# Patient Record
Sex: FEMALE | NOT HISPANIC OR LATINO
[De-identification: names, ages, dates, MRNs, and addresses within clinical notes are randomized per-mention and may not be internally consistent; named-entity substitution may affect disease eponyms.]

---

## 2018-02-19 ENCOUNTER — RESULT REVIEW (OUTPATIENT)
Age: 58
End: 2018-02-19

## 2018-06-30 ENCOUNTER — INPATIENT (INPATIENT)
Facility: HOSPITAL | Age: 58
LOS: 10 days | Discharge: HOME | DRG: 690 | End: 2018-07-11
Attending: STUDENT IN AN ORGANIZED HEALTH CARE EDUCATION/TRAINING PROGRAM | Admitting: STUDENT IN AN ORGANIZED HEALTH CARE EDUCATION/TRAINING PROGRAM
Payer: MEDICAID

## 2018-06-30 VITALS
OXYGEN SATURATION: 99 % | HEART RATE: 99 BPM | DIASTOLIC BLOOD PRESSURE: 78 MMHG | WEIGHT: 151.9 LBS | RESPIRATION RATE: 16 BRPM | TEMPERATURE: 98 F | SYSTOLIC BLOOD PRESSURE: 112 MMHG

## 2018-06-30 DIAGNOSIS — N30.91 CYSTITIS, UNSPECIFIED WITH HEMATURIA: ICD-10-CM

## 2018-06-30 DIAGNOSIS — Z90.710 ACQUIRED ABSENCE OF BOTH CERVIX AND UTERUS: Chronic | ICD-10-CM

## 2018-06-30 LAB
ALBUMIN SERPL ELPH-MCNC: 4.4 G/DL — SIGNIFICANT CHANGE UP (ref 3.3–5)
ALP SERPL-CCNC: 101 U/L — SIGNIFICANT CHANGE UP (ref 40–120)
ALT FLD-CCNC: 37 U/L — SIGNIFICANT CHANGE UP (ref 10–45)
ANION GAP SERPL CALC-SCNC: 18 MMOL/L — HIGH (ref 5–17)
APPEARANCE UR: ABNORMAL
APTT BLD: 26.1 SEC — LOW (ref 27.5–37.4)
AST SERPL-CCNC: 34 U/L — SIGNIFICANT CHANGE UP (ref 10–40)
BACTERIA # UR AUTO: ABNORMAL /HPF
BASOPHILS # BLD AUTO: 0 K/UL — SIGNIFICANT CHANGE UP (ref 0–0.2)
BASOPHILS NFR BLD AUTO: 0 % — SIGNIFICANT CHANGE UP (ref 0–2)
BILIRUB SERPL-MCNC: 0.7 MG/DL — SIGNIFICANT CHANGE UP (ref 0.2–1.2)
BILIRUB UR-MCNC: NEGATIVE — SIGNIFICANT CHANGE UP
BUN SERPL-MCNC: 18 MG/DL — SIGNIFICANT CHANGE UP (ref 7–23)
CALCIUM SERPL-MCNC: 9.3 MG/DL — SIGNIFICANT CHANGE UP (ref 8.4–10.5)
CHLORIDE SERPL-SCNC: 101 MMOL/L — SIGNIFICANT CHANGE UP (ref 96–108)
CO2 SERPL-SCNC: 24 MMOL/L — SIGNIFICANT CHANGE UP (ref 22–31)
COLOR SPEC: ABNORMAL
CREAT SERPL-MCNC: 1.13 MG/DL — SIGNIFICANT CHANGE UP (ref 0.5–1.3)
DIFF PNL FLD: ABNORMAL
EOSINOPHIL # BLD AUTO: 0 K/UL — SIGNIFICANT CHANGE UP (ref 0–0.5)
EOSINOPHIL NFR BLD AUTO: 0 % — SIGNIFICANT CHANGE UP (ref 0–6)
GLUCOSE SERPL-MCNC: 115 MG/DL — HIGH (ref 70–99)
GLUCOSE UR QL: ABNORMAL
HCT VFR BLD CALC: 34.5 % — SIGNIFICANT CHANGE UP (ref 34.5–45)
HGB BLD-MCNC: 11.8 G/DL — SIGNIFICANT CHANGE UP (ref 11.5–15.5)
INR BLD: 1.16 RATIO — SIGNIFICANT CHANGE UP (ref 0.88–1.16)
KETONES UR-MCNC: ABNORMAL
LEUKOCYTE ESTERASE UR-ACNC: ABNORMAL
LYMPHOCYTES # BLD AUTO: 10 % — LOW (ref 13–44)
LYMPHOCYTES # BLD AUTO: 2.4 K/UL — SIGNIFICANT CHANGE UP (ref 1–3.3)
MCHC RBC-ENTMCNC: 34.3 GM/DL — SIGNIFICANT CHANGE UP (ref 32–36)
MCHC RBC-ENTMCNC: 34.8 PG — HIGH (ref 27–34)
MCV RBC AUTO: 101 FL — HIGH (ref 80–100)
MONOCYTES # BLD AUTO: 0.3 K/UL — SIGNIFICANT CHANGE UP (ref 0–0.9)
MONOCYTES NFR BLD AUTO: 1 % — LOW (ref 2–14)
NEUTROPHILS # BLD AUTO: 33.1 K/UL — HIGH (ref 1.8–7.4)
NEUTROPHILS NFR BLD AUTO: 79 % — HIGH (ref 43–77)
NITRITE UR-MCNC: NEGATIVE — SIGNIFICANT CHANGE UP
PH UR: 6.5 — SIGNIFICANT CHANGE UP (ref 5–8)
PLATELET # BLD AUTO: 153 K/UL — SIGNIFICANT CHANGE UP (ref 150–400)
POTASSIUM SERPL-MCNC: 3.6 MMOL/L — SIGNIFICANT CHANGE UP (ref 3.5–5.3)
POTASSIUM SERPL-SCNC: 3.6 MMOL/L — SIGNIFICANT CHANGE UP (ref 3.5–5.3)
PROT SERPL-MCNC: 7 G/DL — SIGNIFICANT CHANGE UP (ref 6–8.3)
PROT UR-MCNC: 300 MG/DL
PROTHROM AB SERPL-ACNC: 12.7 SEC — SIGNIFICANT CHANGE UP (ref 9.8–12.7)
RBC # BLD: 3.4 M/UL — LOW (ref 3.8–5.2)
RBC # FLD: 14.2 % — SIGNIFICANT CHANGE UP (ref 10.3–14.5)
RBC CASTS # UR COMP ASSIST: >50 /HPF (ref 0–2)
SODIUM SERPL-SCNC: 143 MMOL/L — SIGNIFICANT CHANGE UP (ref 135–145)
SP GR SPEC: 1.02 — SIGNIFICANT CHANGE UP (ref 1.01–1.02)
UROBILINOGEN FLD QL: NEGATIVE — SIGNIFICANT CHANGE UP
WBC # BLD: 35.9 K/UL — HIGH (ref 3.8–10.5)
WBC # FLD AUTO: 35.9 K/UL — HIGH (ref 3.8–10.5)
WBC UR QL: SIGNIFICANT CHANGE UP /HPF (ref 0–5)

## 2018-06-30 PROCEDURE — 99223 1ST HOSP IP/OBS HIGH 75: CPT | Mod: AI

## 2018-06-30 PROCEDURE — 99284 EMERGENCY DEPT VISIT MOD MDM: CPT

## 2018-06-30 RX ORDER — SODIUM CHLORIDE 9 MG/ML
1000 INJECTION INTRAMUSCULAR; INTRAVENOUS; SUBCUTANEOUS ONCE
Qty: 0 | Refills: 0 | Status: COMPLETED | OUTPATIENT
Start: 2018-06-30 | End: 2018-06-30

## 2018-06-30 RX ORDER — CEPHALEXIN 500 MG
1 CAPSULE ORAL
Qty: 14 | Refills: 0 | OUTPATIENT
Start: 2018-06-30 | End: 2018-07-06

## 2018-06-30 RX ORDER — CEPHALEXIN 500 MG
500 CAPSULE ORAL ONCE
Qty: 0 | Refills: 0 | Status: COMPLETED | OUTPATIENT
Start: 2018-06-30 | End: 2018-06-30

## 2018-06-30 RX ORDER — CEFTRIAXONE 500 MG/1
1 INJECTION, POWDER, FOR SOLUTION INTRAMUSCULAR; INTRAVENOUS ONCE
Qty: 0 | Refills: 0 | Status: COMPLETED | OUTPATIENT
Start: 2018-06-30 | End: 2018-06-30

## 2018-06-30 RX ORDER — CEFTRIAXONE 500 MG/1
1 INJECTION, POWDER, FOR SOLUTION INTRAMUSCULAR; INTRAVENOUS ONCE
Qty: 0 | Refills: 0 | Status: DISCONTINUED | OUTPATIENT
Start: 2018-06-30 | End: 2018-06-30

## 2018-06-30 RX ADMIN — Medication 500 MILLIGRAM(S): at 21:45

## 2018-06-30 RX ADMIN — SODIUM CHLORIDE 1000 MILLILITER(S): 9 INJECTION INTRAMUSCULAR; INTRAVENOUS; SUBCUTANEOUS at 21:45

## 2018-06-30 RX ADMIN — SODIUM CHLORIDE 1000 MILLILITER(S): 9 INJECTION INTRAMUSCULAR; INTRAVENOUS; SUBCUTANEOUS at 23:01

## 2018-06-30 RX ADMIN — CEFTRIAXONE 100 GRAM(S): 500 INJECTION, POWDER, FOR SOLUTION INTRAMUSCULAR; INTRAVENOUS at 23:01

## 2018-06-30 NOTE — H&P ADULT - NSHPSOCIALHISTORY_GEN_ALL_CORE
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

## 2018-06-30 NOTE — ED ADULT NURSE NOTE - OBJECTIVE STATEMENT
57 y.o F presents to the ED from home c/o hematuria. Patient is awake, alert and speaking coherently. Patient states she is currently being treated for stage 2 breast cancer with chemo- states her last chemo was on Wednesday 6/27. Patient states that since last night she has been noticing bright red blood in her urine accompanied by urinary straining. Additionally, patient states she's had the chills, has been feeling weak and has been having nose bleeds. Patient presents A&Ox3, afebrile and ambulatory; denies fever at home, denies chest pain and SOB, lungs clear; abdomen soft, nontender and nondistended, + some nausea but denies vomiting and diarrhea, denies blood in stool. 57 y.o F presents to the ED from home c/o hematuria. Patient is awake, alert and speaking coherently. Patient states she is currently being treated for stage 2 breast cancer with chemo- states her last chemo was on Wednesday 6/27. Patient states that since last night she has been noticing bright red blood in her urine accompanied by urinary straining. Additionally, patient states she's had the chills, has been feeling weak and has been having nose bleeds. Patient presents A&Ox3, afebrile and ambulatory; denies fever at home, denies chest pain and SOB, lungs clear; abdomen soft, nontender and nondistended, + some nausea but denies vomiting and diarrhea, denies blood in stool. Urine sample is bright red.

## 2018-06-30 NOTE — ED PROVIDER NOTE - PROGRESS NOTE DETAILS
discussed with ONC Dr. Escobar covering for DR. madrid: carboplatin not likely source of hematuria. Agrees that decadron and neulasta could be cause of leukocytosis. Ok with treating for infectious hemorrhagic cystitis. Will f/u with pt on monday. Patient with bandemia 8%.  although appears well, in setting of likely UTI and immunosuppression 2/2 chemotherapy will admit for iv abx and further monitoring.     FABIANO Melgar MD

## 2018-06-30 NOTE — H&P ADULT - NSHPLABSRESULTS_GEN_ALL_CORE
labs/studies reviewed personally by me  ekg ordered by me  wbc 35.9 with 8 bands  hb 11.9, plt 153, bun 18, creat 1.13  UA +  nl liver  inr 1.16

## 2018-06-30 NOTE — H&P ADULT - HISTORY OF PRESENT ILLNESS
57 f with breast cancer on docetaxel and carboplatin last dose Wednesday p/w hematuria - initially dark red yesterday then 3 episodes pinkish urine today. 57 f with breast cancer on docetaxel and carboplatin last dose Wednesday p/w hematuria - initially dark red yesterday followed by an episode of profuse diarrhea then  pinkish colored urine all day today.    Overall pt states she has been feeling very weak and fatigued since chemo session 3 weeks ago and has developed extreme sensitivity of abdomen to light touch (clothing, etc) though no actual pain.  No f/c.  Poor appetite with poor taste for food.  Gagging on medications after chemo (takes a brief course of decadron).  + watery diarrhea surrounding chemo a few times a day with waxing/waning course each day.  No n/v.  + abdominal bloating.  Pt denies dysuria, urinary frequency, urinary hesitancy and urinary urgency.  LMP at 41 yo.      Of note pt states that while chemo was running on Wednesday she felt a brief episode of chest pain.  Has never had cp before.  Was told likely 2nd to chemo as resolved immediately after chemo infused.  Pt mother  of CHF.  Pt had a TTE within the last 6 months.  no sob/cough.  Pt states she has once more chemo session left and planned for mastectomy in August.    In ED received keflex 500mg po and then ctx 1 g with NS 2L.

## 2018-06-30 NOTE — ED PROVIDER NOTE - ATTENDING CONTRIBUTION TO CARE
57F hx 2nd stage left breast ca on chemo s/p chemotherapy last dose 6/27 presents with hematuria beginning last night and continuing today.  Initial dark blood progressing to bright red blood today.  Bleeding only with urination.  No vaginal bleeding.  No antiplatelet/anticoagulant.  No easy bruising.  +lightheadedness.  No chest pain, sob, syncope.  Never had similar symptoms in the past. 57F hx iron-deficiency anemia, non-compliant with supplements, 2nd stage left breast ca on chemo s/p chemotherapy last dose 6/27 presents with hematuria beginning last night and continuing today.  Initial dark blood progressing to bright red blood today.  Bleeding only with urination.  No vaginal bleeding.  No antiplatelet/anticoagulant.  No easy bruising.  +lightheadedness.  No chest pain, sob, syncope.  Never had similar symptoms in the past. 57F hx iron-deficiency anemia, non-compliant with supplements, 2nd stage left breast ca on chemo s/p chemotherapy last dose 6/27 presents with hematuria beginning last night and continuing today.  Initial dark blood progressing to bright red blood today.  Bleeding only with urination.  No vaginal bleeding.  No antiplatelet/anticoagulant.  No easy bruising.  +lightheadedness.  No chest pain, sob, syncope.  Never had similar symptoms in the past.    VSS  Exam as above    Pt with reported hematuria associated with lightheadedness.  Pelvic exam confirms bleeding is hematuria and not vaginal bleeding.  May be 2/2 hemorrhagic cystitis 2/2 infectious etiology or reaction to chemotherapy.  Will obtain cbc, coags to evaluate for coagulopathy as well as anemia.  pt with mild suprapubic ttp with no cvat b/l, unlikely renal colic.  reassess

## 2018-06-30 NOTE — H&P ADULT - PROBLEM SELECTOR PLAN 2
pt s/p chemo on Wednesday and had neulasta and decadron afterwards  monitor wbc which likely is multifactorial with cystitis/medications  symptomatic relief of side effects  onc aware of admission per ED notes

## 2018-06-30 NOTE — H&P ADULT - PROBLEM SELECTOR PLAN 1
no other symptoms but UA concerning for infection  cont ceftriaxone 1 gram daily  f/u urine culture  US kidneys and bladder ordered  may consider urology consult  given hematuria will hold off on SQH for tonight and reassess in am as pt high risk for DVT, encourage ambulation for now

## 2018-06-30 NOTE — ED PROVIDER NOTE - OBJECTIVE STATEMENT
57 y f PMH anemia, breast ca on chemo last chemo wed, presenting with hematuria last night and today. Pt had 1 ep of dark red urine after having BM. Pt had 3 more ep of pink urine x3 today. denies blood in stool, denies vaginal bleeding. LMP at 42 years old. s/p partial hysterectomy. Complains of recent nosebleeds but no bleeding or bruising issues in the past. No fevers, chills, chest pain. Feels weak and lightheaded. 57 y f PMH anemia, breast ca on chemo (docetaxel and carboplatin) last chemo wed, presenting with hematuria last night and today. Pt had 1 ep of dark red urine after having BM. Pt had 3 more ep of pink urine x3 today. denies blood in stool, denies vaginal bleeding. LMP at 42 years old. s/p partial hysterectomy. Complains of recent nosebleeds but no bleeding or bruising issues in the past. No fevers, chills, chest pain. Feels weak and lightheaded.

## 2018-06-30 NOTE — ED PROVIDER NOTE - MEDICAL DECISION MAKING DETAILS
58 yo F PMH anemia and breast cancer on chemo presenting with hematuria x two days. CBC, CMP, UA, urine culture sent. pelvic exam negative for vaginal bleed. CBC returned with HH WNL, WBC 35 --pt on decadron, 8mg bid. awaiting UA and urine culture. 56 yo F PMH anemia and breast cancer on chemo presenting with hematuria x two days. CBC, CMP, UA, urine culture sent. pelvic exam negative for vaginal bleed. pt vitals stable, afebrile, no complaints of chest pain. CBC returned with HH WNL, WBC 35 --pt on decadron, 8mg bid. awaiting UA and urine culture.

## 2018-07-01 DIAGNOSIS — R63.0 ANOREXIA: ICD-10-CM

## 2018-07-01 DIAGNOSIS — C50.812 MALIGNANT NEOPLASM OF OVERLAPPING SITES OF LEFT FEMALE BREAST: ICD-10-CM

## 2018-07-01 DIAGNOSIS — R19.7 DIARRHEA, UNSPECIFIED: ICD-10-CM

## 2018-07-01 DIAGNOSIS — R31.9 HEMATURIA, UNSPECIFIED: ICD-10-CM

## 2018-07-01 DIAGNOSIS — N30.91 CYSTITIS, UNSPECIFIED WITH HEMATURIA: ICD-10-CM

## 2018-07-01 DIAGNOSIS — C50.919 MALIGNANT NEOPLASM OF UNSPECIFIED SITE OF UNSPECIFIED FEMALE BREAST: ICD-10-CM

## 2018-07-01 DIAGNOSIS — M26.629 ARTHRALGIA OF TEMPOROMANDIBULAR JOINT, UNSPECIFIED SIDE: ICD-10-CM

## 2018-07-01 DIAGNOSIS — D64.89 OTHER SPECIFIED ANEMIAS: ICD-10-CM

## 2018-07-01 DIAGNOSIS — D72.829 ELEVATED WHITE BLOOD CELL COUNT, UNSPECIFIED: ICD-10-CM

## 2018-07-01 LAB
ANION GAP SERPL CALC-SCNC: 13 MMOL/L — SIGNIFICANT CHANGE UP (ref 5–17)
ANION GAP SERPL CALC-SCNC: 14 MMOL/L — SIGNIFICANT CHANGE UP (ref 5–17)
BASOPHILS # BLD AUTO: 0.05 K/UL — SIGNIFICANT CHANGE UP (ref 0–0.2)
BASOPHILS NFR BLD AUTO: 0.3 % — SIGNIFICANT CHANGE UP (ref 0–2)
BLD GP AB SCN SERPL QL: NEGATIVE — SIGNIFICANT CHANGE UP
BUN SERPL-MCNC: 14 MG/DL — SIGNIFICANT CHANGE UP (ref 7–23)
BUN SERPL-MCNC: 16 MG/DL — SIGNIFICANT CHANGE UP (ref 7–23)
C DIFF GDH STL QL: NEGATIVE — SIGNIFICANT CHANGE UP
C DIFF GDH STL QL: SIGNIFICANT CHANGE UP
CALCIUM SERPL-MCNC: 6.5 MG/DL — CRITICAL LOW (ref 8.4–10.5)
CALCIUM SERPL-MCNC: 8.2 MG/DL — LOW (ref 8.4–10.5)
CHLORIDE SERPL-SCNC: 103 MMOL/L — SIGNIFICANT CHANGE UP (ref 96–108)
CHLORIDE SERPL-SCNC: 111 MMOL/L — HIGH (ref 96–108)
CO2 SERPL-SCNC: 20 MMOL/L — LOW (ref 22–31)
CO2 SERPL-SCNC: 25 MMOL/L — SIGNIFICANT CHANGE UP (ref 22–31)
CREAT SERPL-MCNC: 0.84 MG/DL — SIGNIFICANT CHANGE UP (ref 0.5–1.3)
CREAT SERPL-MCNC: 0.95 MG/DL — SIGNIFICANT CHANGE UP (ref 0.5–1.3)
CULTURE RESULTS: NO GROWTH — SIGNIFICANT CHANGE UP
EOSINOPHIL # BLD AUTO: 0.03 K/UL — SIGNIFICANT CHANGE UP (ref 0–0.5)
EOSINOPHIL NFR BLD AUTO: 0.2 % — SIGNIFICANT CHANGE UP (ref 0–6)
GLUCOSE SERPL-MCNC: 119 MG/DL — HIGH (ref 70–99)
GLUCOSE SERPL-MCNC: 79 MG/DL — SIGNIFICANT CHANGE UP (ref 70–99)
HCT VFR BLD CALC: 26.6 % — LOW (ref 34.5–45)
HGB BLD-MCNC: 8.9 G/DL — LOW (ref 11.5–15.5)
IMM GRANULOCYTES NFR BLD AUTO: 8.7 % — HIGH (ref 0–1.5)
LYMPHOCYTES # BLD AUTO: 0.98 K/UL — LOW (ref 1–3.3)
LYMPHOCYTES # BLD AUTO: 5.5 % — LOW (ref 13–44)
MAGNESIUM SERPL-MCNC: 1 MG/DL — CRITICAL LOW (ref 1.6–2.6)
MAGNESIUM SERPL-MCNC: 1.3 MG/DL — LOW (ref 1.6–2.6)
MANUAL SMEAR VERIFICATION: SIGNIFICANT CHANGE UP
MCHC RBC-ENTMCNC: 33.3 PG — SIGNIFICANT CHANGE UP (ref 27–34)
MCHC RBC-ENTMCNC: 33.5 GM/DL — SIGNIFICANT CHANGE UP (ref 32–36)
MCV RBC AUTO: 99.6 FL — SIGNIFICANT CHANGE UP (ref 80–100)
MONOCYTES # BLD AUTO: 0.19 K/UL — SIGNIFICANT CHANGE UP (ref 0–0.9)
MONOCYTES NFR BLD AUTO: 1.1 % — LOW (ref 2–14)
NEUTROPHILS # BLD AUTO: 15.05 K/UL — HIGH (ref 1.8–7.4)
NEUTROPHILS NFR BLD AUTO: 84.2 % — HIGH (ref 43–77)
PHOSPHATE SERPL-MCNC: 2.1 MG/DL — LOW (ref 2.5–4.5)
PLAT MORPH BLD: NORMAL — SIGNIFICANT CHANGE UP
PLATELET # BLD AUTO: 107 K/UL — LOW (ref 150–400)
POTASSIUM SERPL-MCNC: 2.8 MMOL/L — CRITICAL LOW (ref 3.5–5.3)
POTASSIUM SERPL-MCNC: 3.5 MMOL/L — SIGNIFICANT CHANGE UP (ref 3.5–5.3)
POTASSIUM SERPL-SCNC: 2.8 MMOL/L — CRITICAL LOW (ref 3.5–5.3)
POTASSIUM SERPL-SCNC: 3.5 MMOL/L — SIGNIFICANT CHANGE UP (ref 3.5–5.3)
RBC # BLD: 2.67 M/UL — LOW (ref 3.8–5.2)
RBC # FLD: 15.7 % — HIGH (ref 10.3–14.5)
RBC BLD AUTO: SIGNIFICANT CHANGE UP
RH IG SCN BLD-IMP: NEGATIVE — SIGNIFICANT CHANGE UP
SODIUM SERPL-SCNC: 142 MMOL/L — SIGNIFICANT CHANGE UP (ref 135–145)
SODIUM SERPL-SCNC: 144 MMOL/L — SIGNIFICANT CHANGE UP (ref 135–145)
SPECIMEN SOURCE: SIGNIFICANT CHANGE UP
WBC # BLD: 17.85 K/UL — HIGH (ref 3.8–10.5)
WBC # FLD AUTO: 17.85 K/UL — HIGH (ref 3.8–10.5)

## 2018-07-01 PROCEDURE — 93010 ELECTROCARDIOGRAM REPORT: CPT

## 2018-07-01 PROCEDURE — 72110 X-RAY EXAM L-2 SPINE 4/>VWS: CPT | Mod: 26

## 2018-07-01 RX ORDER — ACETAMINOPHEN 500 MG
1000 TABLET ORAL ONCE
Qty: 0 | Refills: 0 | Status: COMPLETED | OUTPATIENT
Start: 2018-07-01 | End: 2018-07-01

## 2018-07-01 RX ORDER — MAGNESIUM SULFATE 500 MG/ML
1 VIAL (ML) INJECTION ONCE
Qty: 0 | Refills: 0 | Status: COMPLETED | OUTPATIENT
Start: 2018-07-01 | End: 2018-07-01

## 2018-07-01 RX ORDER — SODIUM CHLORIDE 9 MG/ML
1000 INJECTION INTRAMUSCULAR; INTRAVENOUS; SUBCUTANEOUS
Qty: 0 | Refills: 0 | Status: COMPLETED | OUTPATIENT
Start: 2018-07-01 | End: 2018-07-01

## 2018-07-01 RX ORDER — ACETAMINOPHEN 500 MG
650 TABLET ORAL EVERY 6 HOURS
Qty: 0 | Refills: 0 | Status: DISCONTINUED | OUTPATIENT
Start: 2018-07-01 | End: 2018-07-11

## 2018-07-01 RX ORDER — CEFTRIAXONE 500 MG/1
1 INJECTION, POWDER, FOR SOLUTION INTRAMUSCULAR; INTRAVENOUS EVERY 24 HOURS
Qty: 0 | Refills: 0 | Status: DISCONTINUED | OUTPATIENT
Start: 2018-07-01 | End: 2018-07-03

## 2018-07-01 RX ADMIN — Medication 650 MILLIGRAM(S): at 08:32

## 2018-07-01 RX ADMIN — SODIUM CHLORIDE 100 MILLILITER(S): 9 INJECTION INTRAMUSCULAR; INTRAVENOUS; SUBCUTANEOUS at 02:28

## 2018-07-01 RX ADMIN — Medication 650 MILLIGRAM(S): at 07:32

## 2018-07-01 RX ADMIN — CEFTRIAXONE 100 GRAM(S): 500 INJECTION, POWDER, FOR SOLUTION INTRAMUSCULAR; INTRAVENOUS at 02:28

## 2018-07-01 RX ADMIN — Medication 1000 MILLIGRAM(S): at 04:59

## 2018-07-01 RX ADMIN — Medication 100 GRAM(S): at 17:46

## 2018-07-01 RX ADMIN — Medication 400 MILLIGRAM(S): at 04:17

## 2018-07-01 NOTE — CONSULT NOTE ADULT - PROBLEM SELECTOR RECOMMENDATION 2
Currently on neoadjuvant chemotherapy with TCHP (Taxol, Carboplatin, herceptin, perjecta)  - s/p 5 of 6 doses. Also received neulasta (peg-filgastrim)  - Leukocytosis likely due to injection  - Plan to resume therapy once acute issues resolve  - Will follow    Kamran Escobar MD  [facility] - A Division of [facility]   [address] [place] [id number]  (T) [phone]  (F) [phone]

## 2018-07-01 NOTE — PROGRESS NOTE ADULT - PROBLEM SELECTOR PLAN 1
no other symptoms but UA concerning for infection  cont ceftriaxone 1 gram daily  f/u urine culture  US kidneys and bladder ordered  may consider urology consult  ID consult  given hematuria will hold off on SQH for tonight and reassess in am as pt high risk for DVT, encourage ambulation for now no other symptoms but UA concerning for infection  cont ceftriaxone 1 gram daily  f/u urine culture  US kidneys and bladder ordered  may consider urology consult  ID consult  given hematuria will hold off on SQH for tonight and reassess in am as pt high risk for DVT, encourage ambulation for now  hypokalemia, hypocalcemia, hypo mag, repeat BMP, and supplement as necessary cont ceftriaxone 1 gram daily  f/u urine culture  US kidneys and bladder ordered  may consider urology consult  ID consult  given hematuria will hold off on SQH for tonight and reassess in am as pt high risk for DVT, encourage ambulation for now  hypokalemia, hypocalcemia, hypo mag, repeat BMP, and supplement as necessary

## 2018-07-01 NOTE — CONSULT NOTE ADULT - ASSESSMENT
57 year old woman with ER/LA positive, [id number] positive breast cancer undergoing neoadjuvant chemotherapy with TCHP admitted with hematuria.

## 2018-07-01 NOTE — CONSULT NOTE ADULT - PROBLEM SELECTOR RECOMMENDATION 3
already improving, will follow.    Thank you for consulting us and involving us in the management of this most interesting and challenging case.     We will follow along in the care of this patient.

## 2018-07-01 NOTE — PROGRESS NOTE ADULT - SUBJECTIVE AND OBJECTIVE BOX
Urology called to evaluate hematuria. Urine color at bedside is clear yellow with small sediments. Discussed with nurse who reports color is the same as this morning. PVR 30cc.  -Follow up KUB and urine culture   -Recall  if pt becomes grossly hematuric or other acute  issue   -F/u for outpatient work up of microscopic hematuria with Dr. Rubio [phone]

## 2018-07-01 NOTE — CONSULT NOTE ADULT - PROBLEM SELECTOR RECOMMENDATION 9
Unclear at this point if this represents a rare complication not commonly described with the current chemo meds or is due to an acute infectious process. Recommend ceftriaxone as we await micro data

## 2018-07-01 NOTE — CHART NOTE - NSCHARTNOTEFT_GEN_A_CORE
MEDICINE [facility]  Patient is a 57 year old female who presents with a chief complaint of hematuria. (30 Jun 2018 23:13)       Event Summary:  Called by RN to report patient complaining of abdominal pain and low back pain after having a bowel movement.  Patient seen and examined at the bedside.  She reports sharp, left upper quadrant pain after having an episode of diarrhea.  Patient also reports new onset of left low back pain which she also states is sharp.  She does not endorse any other associated symptoms.  Denies numbness, tingling, weakness, nausea and vomiting.       Vital Signs Last 24 Hrs  T(C): 36.7 (01 Jul 2018 00:03), Max: 36.8 (30 Jun 2018 19:45)  T(F): 98.1 (01 Jul 2018 00:03), Max: 98.3 (30 Jun 2018 19:45)  HR: 83 (01 Jul 2018 00:03) (83 - 105)  BP: 115/70 (01 Jul 2018 00:03) (104/75 - 115/70)  BP(mean): 80 (30 Jun 2018 23:13) (80 - 80)  RR: 19 (01 Jul 2018 00:03) (16 - 19)  SpO2: 100% (01 Jul 2018 00:03) (99% - 100%)      PHYSICAL EXAM:  GENERAL: Laying down, appears to be in pain  HEART: +S1/S2.  Regular rate and rhythm.  No murmurs, rubs or gallops  CHEST/LUNG: Breath sounds clear to auscultation bilaterally.  No wheezes, rales, rhonchi or crackles  ABDOMEN: Bowel sounds present in all 4 quadrants.  Soft, Nontender, Nondistended.  No rigidity or guarding noted  BACK: No CVA tenderness.  +mild tenderness to palpation of paraspinous muscles in left lower back      HPI:  Patient is a 57 year old female with a PMHx of breast cancer (on Docetaxel and Carboplatin - last dose Wednesday) who presented with hematuria. (30 Jun 2018 23:13)  Now with abdominal pain, diarrhea and low back pain.      PLAN: Abdominal pain / diarrhea  - Abdominal exam benign  - Can consider imaging if pain is persistent or worsening  - C. diff culture ordered.  Follow up with results  - Will continue to monitor closely  - Primary team to follow up in AM      PLAN: Low back pain  - IV Tylenol x1 dose ordered  - Lumbosacral x-ray ordered.  Follow up with results  - Will continue to monitor closely  - Primary team to follow up in       #[id number]  María Owens PA-C  Medicine Department

## 2018-07-01 NOTE — CONSULT NOTE ADULT - ASSESSMENT
57 f with breast cancer on docetaxel and carboplatin last dose Wednesday p/w hematuria, and leukocytosis

## 2018-07-01 NOTE — CONSULT NOTE ADULT - SUBJECTIVE AND OBJECTIVE BOX
HPI:  57 f with breast cancer on docetaxel and carboplatin last dose Wednesday p/w hematuria - initially dark red yesterday followed by an episode of profuse diarrhea then  pinkish colored urine all day.    Overall pt states she has been feeling very weak and fatigued since chemo session 3 weeks ago and has developed extreme sensitivity of abdomen to light touch (clothing, etc) though no actual pain.  No f/c.  Poor appetite with poor taste for food.  Gagging on medications after chemo (takes a brief course of decadron).  + watery diarrhea surrounding chemo a few times a day with waxing/waning course each day.  No n/v.  + abdominal bloating.  Pt denies dysuria, urinary frequency, urinary hesitancy and urinary urgency.  LMP at 41 yo.      Of note pt states that while chemo was running on Wednesday she felt a brief episode of chest pain.  Has never had cp before.  Was told likely 2nd to chemo as resolved immediately after chemo infused.  Pt mother  of CHF.  Pt had a TTE within the last 6 months.  no sob/cough.  Pt states she has once more chemo session left and planned for mastectomy in August.    In ED received keflex 500mg po and then ctx 1 g with NS 2L. (2018 23:13)    Patient reports that although she has had prior UTIs this does not feel to her like any of these prior infections.      PAST MEDICAL & SURGICAL HISTORY:  Murmur, heart  Asthma  Breast cancer  H/O abdominal hysterectomy      Antimicrobials  cefTRIAXone   IVPB 1 Gram(s) IV Intermittent every 24 hours      Immunological      Other  acetaminophen   Tablet. 650 milliGRAM(s) Oral every 6 hours PRN      Allergies    No Known Allergies    Intolerances    SOCIAL HISTORY: no toxic habits reported    FAMILY HISTORY:  Family history of CHF (congestive heart failure) (Mother)      ROS:    EYES:  Negative  blurry vision or double vision  GASTROINTESTINAL:  Negative for nausea, vomiting, diarrhea  -otherwise negative except for subjective    Vital Signs Last 24 Hrs  T(C): 36.7 (2018 07:32), Max: 36.8 (2018 19:45)  T(F): 98 (2018 07:32), Max: 98.3 (2018 19:45)  HR: 84 (2018 07:32) (83 - 105)  BP: 111/71 (2018 07:32) (104/75 - 115/70)  BP(mean): 80 (2018 23:13) (80 - 80)  RR: 18 (2018 07:32) (16 - 19)  SpO2: 98% (2018 07:32) (98% - 100%)    PE:  WDWN in no distress  HEENT:  NC, PERRL, sclerae anicteric, conjunctivae clear, EOMI.  Sinuses nontender, no nasal exudate.  No buccal or pharyngeal lesions, erythema or exudate  Neck:  Supple, no adenopathy  Lungs:  No accessory muscle use, bilaterally clear to auscultation  Cor:  RRR, S1, S2, no murmur appreciated  Abd:  Symmetric, normoactive BS.  Soft, mild suprapubic tenderness, no masses, guarding or rebound.  Liver and spleen not enlarged  Extrem:  No cyanosis or edema  Skin:  multiple skin lesions noted  Neuro: grossly intact  Musc: moving all limbs freely, no focal deficits    LABS:                        8.9    17.85 )-----------( 107      ( 2018 08:25 )             26.6       WBC Count: 17.85 K/uL (18 @ 08:25)  WBC Count: 35.9 K/uL (18 @ 20:31)          142  |  103  |  16  ----------------------------<  119<H>  3.5   |  25  |  0.95    Ca    8.2<L>      2018 09:25  Phos  2.8       Mg     1.3         TPro  7.0  /  Alb  4.4  /  TBili  0.7  /  DBili  x   /  AST  34  /  ALT  37  /  AlkPhos  101        Creatinine, Serum: 0.95 mg/dL (18 @ 09:25)  Creatinine, Serum: 0.84 mg/dL (18 @ 06:17)  Creatinine, Serum: 1.13 mg/dL (18 @ 20:31)      Urinalysis Basic - ( 2018 20:31 )    Color: Red / Appearance: Turbid / S.017 / pH: x  Gluc: x / Ketone: Trace  / Bili: Negative / Urobili: Negative   Blood: x / Protein: 300 mg/dL / Nitrite: Negative   Leuk Esterase: Moderate / RBC: >50 /HPF / WBC 26-50 /HPF   Sq Epi: x / Non Sq Epi: x / Bacteria: Moderate /HPF      MICROBIOLOGY:      RADIOLOGY & ADDITIONAL STUDIES:    --

## 2018-07-01 NOTE — PROGRESS NOTE ADULT - SUBJECTIVE AND OBJECTIVE BOX
Patient is a 57y old  Female who presents with a chief complaint of hematuria (2018 23:13)      SUBJECTIVE / OVERNIGHT EVENTS:    Patient seen and examined.       Vital Signs Last 24 Hrs  T(C): 36.7 (2018 07:32), Max: 36.8 (2018 19:45)  T(F): 98 (2018 07:32), Max: 98.3 (2018 19:45)  HR: 84 (2018 07:32) (83 - 105)  BP: 111/71 (2018 07:32) (104/75 - 115/70)  BP(mean): 80 (2018 23:13) (80 - 80)  RR: 18 (2018 07:32) (16 - 19)  SpO2: 98% (2018 07:32) (98% - 100%)  I&O's Summary    2018 07:01  -  2018 07:00  --------------------------------------------------------  IN: 0 mL / OUT: 2 mL / NET: -2 mL        PE:  GENERAL: NAD, AAOx3  HEAD:  Atraumatic, Normocephalic  EYES: EOMI, PERRLA, conjunctiva and sclera clear  NECK: Supple, No JVD  CHEST/LUNG: CTABL, No wheeze  HEART: Regular rate and rhythm; + murmur  ABDOMEN: Soft, Nontender, Nondistended; Bowel sounds present  EXTREMITIES:  2+ Peripheral Pulses, No clubbing, cyanosis, or edema  SKIN: No rashes or lesions  NEURO: No focal deficits    LABS:                        11.8   35.9  )-----------( 153      ( 2018 20:31 )             34.5     07-    144  |  111<H>  |  14  ----------------------------<  79  2.8<LL>   |  20<L>  |  0.84    Ca    6.5<LL>      2018 06:17  Phos  2.1       Mg     1.0         TPro  7.0  /  Alb  4.4  /  TBili  0.7  /  DBili  x   /  AST  34  /  ALT  37  /  AlkPhos  101  06-30    PT/INR - ( 2018 20:31 )   PT: 12.7 sec;   INR: 1.16 ratio         PTT - ( 2018 20:31 )  PTT:26.1 sec  CAPILLARY BLOOD GLUCOSE            Urinalysis Basic - ( 2018 20:31 )    Color: Red / Appearance: Turbid / S.017 / pH: x  Gluc: x / Ketone: Trace  / Bili: Negative / Urobili: Negative   Blood: x / Protein: 300 mg/dL / Nitrite: Negative   Leuk Esterase: Moderate / RBC: >50 /HPF / WBC 26-50 /HPF   Sq Epi: x / Non Sq Epi: x / Bacteria: Moderate /HPF        RADIOLOGY & ADDITIONAL TESTS:    Imaging Personally Reviewed:  [x] YES  [ ] NO    Consultant(s) Notes Reviewed:  [x] YES  [ ] NO    MEDICATIONS  (STANDING):  cefTRIAXone   IVPB 1 Gram(s) IV Intermittent every 24 hours    MEDICATIONS  (PRN):  acetaminophen   Tablet. 650 milliGRAM(s) Oral every 6 hours PRN Mild Pain (1 - 3)      Care Discussed with Consultants/Other Providers [x] YES  [ ] NO    HEALTH ISSUES - PROBLEM Dx:  Arthralgia of temporomandibular joint, unspecified laterality: Arthralgia of temporomandibular joint, unspecified laterality  Anorexia: Anorexia  Diarrhea, unspecified type: Diarrhea, unspecified type  Anemia due to other cause: Anemia due to other cause  Malignant neoplasm of female breast, unspecified estrogen receptor status, unspecified laterality, unspecified site of breast: Malignant neoplasm of female breast, unspecified estrogen receptor status, unspecified laterality, unspecified site of breast  Hemorrhagic cystitis: Hemorrhagic cystitis Patient is a 57y old  Female who presents with a chief complaint of hematuria (2018 23:13)      SUBJECTIVE / OVERNIGHT EVENTS:    Patient seen and examined. co diarrhea associated with chemo. hematuria started 2 days ago. mild abd discomfort attributed to chemo.      Vital Signs Last 24 Hrs  T(C): 36.7 (2018 07:32), Max: 36.8 (2018 19:45)  T(F): 98 (2018 07:32), Max: 98.3 (2018 19:45)  HR: 84 (2018 07:32) (83 - 105)  BP: 111/71 (2018 07:32) (104/75 - 115/70)  BP(mean): 80 (2018 23:13) (80 - 80)  RR: 18 (2018 07:32) (16 - 19)  SpO2: 98% (2018 07:32) (98% - 100%)  I&O's Summary    2018 07:01  -  2018 07:00  --------------------------------------------------------  IN: 0 mL / OUT: 2 mL / NET: -2 mL        PE:  GENERAL: NAD, AAOx3  HEAD:  Atraumatic, Normocephalic  EYES: EOMI, PERRLA, conjunctiva and sclera clear  NECK: Supple, No JVD  CHEST/LUNG: CTABL, No wheeze  HEART: Regular rate and rhythm; no murmur  ABDOMEN: Soft, Nontender, Nondistended; Bowel sounds present  EXTREMITIES:  2+ Peripheral Pulses, No clubbing, cyanosis, or edema  SKIN: No rashes or lesions  NEURO: No focal deficits    LABS:                        11.8   35.9  )-----------( 153      ( 2018 20:31 )             34.5     07-    144  |  111<H>  |  14  ----------------------------<  79  2.8<LL>   |  20<L>  |  0.84    Ca    6.5<LL>      2018 06:17  Phos  2.1     07-  Mg     1.0     -    TPro  7.0  /  Alb  4.4  /  TBili  0.7  /  DBili  x   /  AST  34  /  ALT  37  /  AlkPhos  101  -    PT/INR - ( 2018 20:31 )   PT: 12.7 sec;   INR: 1.16 ratio         PTT - ( 2018 20:31 )  PTT:26.1 sec  CAPILLARY BLOOD GLUCOSE            Urinalysis Basic - ( 2018 20:31 )    Color: Red / Appearance: Turbid / S.017 / pH: x  Gluc: x / Ketone: Trace  / Bili: Negative / Urobili: Negative   Blood: x / Protein: 300 mg/dL / Nitrite: Negative   Leuk Esterase: Moderate / RBC: >50 /HPF / WBC 26-50 /HPF   Sq Epi: x / Non Sq Epi: x / Bacteria: Moderate /HPF        RADIOLOGY & ADDITIONAL TESTS:    Imaging Personally Reviewed:  [x] YES  [ ] NO    Consultant(s) Notes Reviewed:  [x] YES  [ ] NO    MEDICATIONS  (STANDING):  cefTRIAXone   IVPB 1 Gram(s) IV Intermittent every 24 hours    MEDICATIONS  (PRN):  acetaminophen   Tablet. 650 milliGRAM(s) Oral every 6 hours PRN Mild Pain (1 - 3)      Care Discussed with Consultants/Other Providers [x] YES  [ ] NO    HEALTH ISSUES - PROBLEM Dx:  Arthralgia of temporomandibular joint, unspecified laterality: Arthralgia of temporomandibular joint, unspecified laterality  Anorexia: Anorexia  Diarrhea, unspecified type: Diarrhea, unspecified type  Anemia due to other cause: Anemia due to other cause  Malignant neoplasm of female breast, unspecified estrogen receptor status, unspecified laterality, unspecified site of breast: Malignant neoplasm of female breast, unspecified estrogen receptor status, unspecified laterality, unspecified site of breast  Hemorrhagic cystitis: Hemorrhagic cystitis

## 2018-07-01 NOTE — CONSULT NOTE ADULT - PROBLEM SELECTOR RECOMMENDATION 9
Unclear etiology  - None of current chemotherapy drugs is known to cause hemorrhagic cystitis  - Platelet count is adequate  - Follow up urine culture  - Distant history of "stones" and also reports back pain. Consider imaging for evaluation for nephrolithiasis  - Consider  consult

## 2018-07-01 NOTE — CONSULT NOTE ADULT - SUBJECTIVE AND OBJECTIVE BOX
[facility] Hematology/Oncology - Kecia Dhaliwal / Preston / Shawn - [phone]  Primary Outpatient Hematologist/Oncologist: Dr. Nicola Steen    Chief Complaint:  Hematuria    HPI:  Patient is a 57 year old woman with history of breast cancer who is undergoing neoadjuvant chemotherapy who presents with hematuria. Reports hematuria started on Friday and also reports having profound diarrhea. Urine was intially dark red but it has not turned pink. She also reports abdominal pain and severe back pain intermittently for the last two days. No other complaints    Hem/Onc History  Stage II breast cancer. Left breast mass. 4.2cm mass with multiple smaller surrounding masses. Dx 2/2018. ER 90%, CT 0%, [id number] 2/3+ with amplification on FISH. On neoadjuvant chemotherapy with Carboplatin, taxol, herceptin, and perjecta every 3 weeks for 6 doses prior to surgery. s/p 5 of 6 doses with last dose given on 6/27/2018 with onPro Neulasta injection.     ROS:  General:  (+)Pain, (-)Decrease appeite, (-)Fevers, (-)Chills, (-)Weight Loss  Eyes: (-)Blurry Vision, (-)Double Vision, (-)Vision Loss  ENT: (-)Sinus Congestion, (-)Decreased Hearing, (-)Nosebleeds, (-)Sore Throat  Cardiac: (-)Chest Pain, (-)Palpitations, (-)Shortness of breathe on exertion  Respiratory: (-)Cough, (-)hemoptysis, (-)Shortness of breathe  GI: (-)Nausea, (-)Vomiting, (-)Diarrhea, (-)Constipation, (-)Melena, (-)BRBPR  : (+)Hematuria, (-)Dysuria, (-)Polyuia  MSK: (-)Back pain, (-)Joint pain, (-)Stiffness  Dermatology: (-)Rash, (-)Itching  Neurology:  (-)Numbness, (-)Tingling, (-)Difficulty Walking, (-)Tremors, (+)Weakness  Psych: (-)Anxiety, (-)Depression, (-)Memory Loss  Hematology:  (-)Easy Bruising, (-)Easy Bleeding, (-)Night Sweats, (+)Hematuria    PAST MEDICAL & SURGICAL HISTORY:  Murmur, heart  Asthma  Breast cancer  H/O abdominal hysterectomy    Social History  Tobacco: Denies current use  Alcohol: Denies current use  Drugs:  Denies current use    FAMILY HISTORY:  Family history of CHF (congestive heart failure) (Mother)    MEDICATIONS  (STANDING):  cefTRIAXone   IVPB 1 Gram(s) IV Intermittent every 24 hours    MEDICATIONS  (PRN):  acetaminophen   Tablet. 650 milliGRAM(s) Oral every 6 hours PRN Mild Pain (1 - 3)    Allergies  No Known Allergies    Vital Signs Last 24 Hrs  T(C): 36.7 (01 Jul 2018 07:32), Max: 36.8 (30 Jun 2018 19:45)  T(F): 98 (01 Jul 2018 07:32), Max: 98.3 (30 Jun 2018 19:45)  HR: 84 (01 Jul 2018 07:32) (83 - 105)  BP: 111/71 (01 Jul 2018 07:32) (104/75 - 115/70)  BP(mean): 80 (30 Jun 2018 23:13) (80 - 80)  RR: 18 (01 Jul 2018 07:32) (16 - 19)  SpO2: 98% (01 Jul 2018 07:32) (98% - 100%)    Physical Exam:  General: AAO x 3. NAD. Lying in bed comfortably.  HEENT: clear oropharynx, anicteric sclera, pink conjunctivae, EOMi. PERRLA  Cardiac: S1, S2 present. No audible murmurs. RRR  Lungs: CTA B/L.   Abdomen: Soft, Non-Tender, Non-Distended. No palpable hepatosplenomegaly  Extremities: 2+ pulses. No edema  Skin: No Rashes. No petechiae  Neuro: No focal motor or sensory deficits.     Labs:  CBC Full  -  ( 01 Jul 2018 08:25 )  WBC Count : 17.85 K/uL  Hemoglobin : 8.9 g/dL  Hematocrit : 26.6 %  Platelet Count - Automated : 107 K/uL  Mean Cell Volume : 99.6 fl  Mean Cell Hemoglobin : 33.3 pg  Mean Cell Hemoglobin Concentration : 33.5 gm/dL  Auto Neutrophil # : x  Auto Lymphocyte # : x  Auto Monocyte # : x  Auto Eosinophil # : x  Auto Basophil # : x  Auto Neutrophil % : x  Auto Lymphocyte % : x  Auto Monocyte % : x  Auto Eosinophil % : x  Auto Basophil % : x    07-01    142  |  103  |  16  ----------------------------<  119<H>  3.5   |  25  |  0.95    Ca    8.2<L>      01 Jul 2018 09:25  Phos  2.1     07-01  Mg     1.3     07-01    TPro  7.0  /  Alb  4.4  /  TBili  0.7  /  DBili  x   /  AST  34  /  ALT  37  /  AlkPhos  101  06-30    PT/INR - ( 30 Jun 2018 20:31 )   PT: 12.7 sec;   INR: 1.16 ratio    PTT - ( 30 Jun 2018 20:31 )  PTT:26.1 sec    Urinalysis + Microscopic Examination (06.30.18 @ 20:31)    Color: Red    Urobilinogen: Negative    Specific Gravity: 1.017    Protein, Urine: 300 mg/dL    Glucose Qualitative, Urine: Trace    Blood, Urine: Large    Urine Appearance: Turbid    Bilirubin: Negative    pH Urine: 6.5    Leukocyte Esterase Concentration: Moderate    Nitrite: Negative    Ketone - Urine: Trace    Red Blood Cell - Urine: >50 /HPF    White Blood Cell - Urine: 26-50 /HPF    Bacteria: Moderate /HPF

## 2018-07-02 DIAGNOSIS — N30.01 ACUTE CYSTITIS WITH HEMATURIA: ICD-10-CM

## 2018-07-02 LAB
ALBUMIN SERPL ELPH-MCNC: 4 G/DL — SIGNIFICANT CHANGE UP (ref 3.3–5)
ALP SERPL-CCNC: 86 U/L — SIGNIFICANT CHANGE UP (ref 40–120)
ALT FLD-CCNC: 44 U/L — SIGNIFICANT CHANGE UP (ref 10–45)
ANION GAP SERPL CALC-SCNC: 11 MMOL/L — SIGNIFICANT CHANGE UP (ref 5–17)
ANION GAP SERPL CALC-SCNC: 12 MMOL/L — SIGNIFICANT CHANGE UP (ref 5–17)
AST SERPL-CCNC: 27 U/L — SIGNIFICANT CHANGE UP (ref 10–40)
BILIRUB SERPL-MCNC: 0.3 MG/DL — SIGNIFICANT CHANGE UP (ref 0.2–1.2)
BUN SERPL-MCNC: 12 MG/DL — SIGNIFICANT CHANGE UP (ref 7–23)
BUN SERPL-MCNC: 14 MG/DL — SIGNIFICANT CHANGE UP (ref 7–23)
CALCIUM SERPL-MCNC: 8.5 MG/DL — SIGNIFICANT CHANGE UP (ref 8.4–10.5)
CALCIUM SERPL-MCNC: 9.2 MG/DL — SIGNIFICANT CHANGE UP (ref 8.4–10.5)
CHLORIDE SERPL-SCNC: 102 MMOL/L — SIGNIFICANT CHANGE UP (ref 96–108)
CHLORIDE SERPL-SCNC: 102 MMOL/L — SIGNIFICANT CHANGE UP (ref 96–108)
CO2 SERPL-SCNC: 25 MMOL/L — SIGNIFICANT CHANGE UP (ref 22–31)
CO2 SERPL-SCNC: 27 MMOL/L — SIGNIFICANT CHANGE UP (ref 22–31)
CREAT SERPL-MCNC: 0.79 MG/DL — SIGNIFICANT CHANGE UP (ref 0.5–1.3)
CREAT SERPL-MCNC: 0.89 MG/DL — SIGNIFICANT CHANGE UP (ref 0.5–1.3)
GLUCOSE SERPL-MCNC: 107 MG/DL — HIGH (ref 70–99)
GLUCOSE SERPL-MCNC: 133 MG/DL — HIGH (ref 70–99)
HCT VFR BLD CALC: 29 % — LOW (ref 34.5–45)
HGB BLD-MCNC: 9.6 G/DL — LOW (ref 11.5–15.5)
MAGNESIUM SERPL-MCNC: 2 MG/DL — SIGNIFICANT CHANGE UP (ref 1.6–2.6)
MCHC RBC-ENTMCNC: 32.8 PG — SIGNIFICANT CHANGE UP (ref 27–34)
MCHC RBC-ENTMCNC: 33.1 GM/DL — SIGNIFICANT CHANGE UP (ref 32–36)
MCV RBC AUTO: 99 FL — SIGNIFICANT CHANGE UP (ref 80–100)
PLATELET # BLD AUTO: 98 K/UL — LOW (ref 150–400)
POTASSIUM SERPL-MCNC: 3.2 MMOL/L — LOW (ref 3.5–5.3)
POTASSIUM SERPL-MCNC: 4.4 MMOL/L — SIGNIFICANT CHANGE UP (ref 3.5–5.3)
POTASSIUM SERPL-SCNC: 3.2 MMOL/L — LOW (ref 3.5–5.3)
POTASSIUM SERPL-SCNC: 4.4 MMOL/L — SIGNIFICANT CHANGE UP (ref 3.5–5.3)
PROT SERPL-MCNC: 6.3 G/DL — SIGNIFICANT CHANGE UP (ref 6–8.3)
RBC # BLD: 2.93 M/UL — LOW (ref 3.8–5.2)
RBC # FLD: 15.3 % — HIGH (ref 10.3–14.5)
SODIUM SERPL-SCNC: 139 MMOL/L — SIGNIFICANT CHANGE UP (ref 135–145)
SODIUM SERPL-SCNC: 140 MMOL/L — SIGNIFICANT CHANGE UP (ref 135–145)
WBC # BLD: 7.17 K/UL — SIGNIFICANT CHANGE UP (ref 3.8–10.5)
WBC # FLD AUTO: 7.17 K/UL — SIGNIFICANT CHANGE UP (ref 3.8–10.5)

## 2018-07-02 PROCEDURE — 74176 CT ABD & PELVIS W/O CONTRAST: CPT | Mod: 26

## 2018-07-02 RX ORDER — MAGNESIUM SULFATE 500 MG/ML
2 VIAL (ML) INJECTION ONCE
Qty: 0 | Refills: 0 | Status: COMPLETED | OUTPATIENT
Start: 2018-07-02 | End: 2018-07-02

## 2018-07-02 RX ORDER — POTASSIUM CHLORIDE 20 MEQ
40 PACKET (EA) ORAL EVERY 4 HOURS
Qty: 0 | Refills: 0 | Status: DISCONTINUED | OUTPATIENT
Start: 2018-07-02 | End: 2018-07-02

## 2018-07-02 RX ORDER — POTASSIUM CHLORIDE 20 MEQ
40 PACKET (EA) ORAL EVERY 4 HOURS
Qty: 0 | Refills: 0 | Status: COMPLETED | OUTPATIENT
Start: 2018-07-02 | End: 2018-07-02

## 2018-07-02 RX ADMIN — Medication 40 MILLIEQUIVALENT(S): at 10:41

## 2018-07-02 RX ADMIN — Medication 50 GRAM(S): at 10:29

## 2018-07-02 RX ADMIN — CEFTRIAXONE 100 GRAM(S): 500 INJECTION, POWDER, FOR SOLUTION INTRAMUSCULAR; INTRAVENOUS at 02:58

## 2018-07-02 RX ADMIN — Medication 40 MILLIEQUIVALENT(S): at 14:01

## 2018-07-02 NOTE — PROGRESS NOTE ADULT - SUBJECTIVE AND OBJECTIVE BOX
Patient is a 57y old  Female who presents with a chief complaint of hematuria (2018 23:13)      SUBJECTIVE / OVERNIGHT EVENTS:    Patient seen and examined.       Vital Signs Last 24 Hrs  T(C): 36.7 (2018 07:37), Max: 36.8 (2018 16:17)  T(F): 98.1 (2018 07:37), Max: 98.2 (2018 16:17)  HR: 93 (2018 07:37) (85 - 93)  BP: 100/62 (2018 08:08) (96/65 - 101/68)  BP(mean): --  RR: 18 (2018 07:37) (18 - 18)  SpO2: 98% (2018 07:37) (98% - 99%)  I&O's Summary    2018 07:01  -  2018 07:00  --------------------------------------------------------  IN: 720 mL / OUT: 1 mL / NET: 719 mL        PE:  GENERAL: NAD, AAOx3  HEAD:  Atraumatic, Normocephalic  EYES: EOMI, PERRLA, conjunctiva and sclera clear  NECK: Supple, No JVD  CHEST/LUNG: CTABL, No wheeze  HEART: Regular rate and rhythm; + murmur  ABDOMEN: Soft, Nontender, Nondistended; Bowel sounds present  EXTREMITIES:  2+ Peripheral Pulses, No clubbing, cyanosis, or edema  SKIN: No rashes or lesions  NEURO: No focal deficits    LABS:                        8.9    17.85 )-----------( 107      ( 2018 08:25 )             26.6     07-02    140  |  102  |  12  ----------------------------<  107<H>  3.2<L>   |  27  |  0.79    Ca    8.5      2018 07:07  Phos  2.8     07-  Mg     1.3     07-    TPro  7.0  /  Alb  4.4  /  TBili  0.7  /  DBili  x   /  AST  34  /  ALT  37  /  AlkPhos  101  06-30    PT/INR - ( 2018 20:31 )   PT: 12.7 sec;   INR: 1.16 ratio         PTT - ( 2018 20:31 )  PTT:26.1 sec  CAPILLARY BLOOD GLUCOSE            Urinalysis Basic - ( 2018 20:31 )    Color: Red / Appearance: Turbid / S.017 / pH: x  Gluc: x / Ketone: Trace  / Bili: Negative / Urobili: Negative   Blood: x / Protein: 300 mg/dL / Nitrite: Negative   Leuk Esterase: Moderate / RBC: >50 /HPF / WBC 26-50 /HPF   Sq Epi: x / Non Sq Epi: x / Bacteria: Moderate /HPF        RADIOLOGY & ADDITIONAL TESTS:    Imaging Personally Reviewed:  [x] YES  [ ] NO    Consultant(s) Notes Reviewed:  [x] YES  [ ] NO    MEDICATIONS  (STANDING):  cefTRIAXone   IVPB 1 Gram(s) IV Intermittent every 24 hours    MEDICATIONS  (PRN):  acetaminophen   Tablet. 650 milliGRAM(s) Oral every 6 hours PRN Mild Pain (1 - 3)      Care Discussed with Consultants/Other Providers [x] YES  [ ] NO    HEALTH ISSUES - PROBLEM Dx:  Leukocytosis, unspecified type: Leukocytosis, unspecified type  Breast cancer: Breast cancer  Malignant neoplasm of overlapping sites of left breast in female, estrogen receptor positive: Malignant neoplasm of overlapping sites of left breast in female, estrogen receptor positive  Hematuria: Hematuria  Arthralgia of temporomandibular joint, unspecified laterality: Arthralgia of temporomandibular joint, unspecified laterality  Anorexia: Anorexia  Diarrhea, unspecified type: Diarrhea, unspecified type  Anemia due to other cause: Anemia due to other cause  Malignant neoplasm of female breast, unspecified estrogen receptor status, unspecified laterality, unspecified site of breast: Malignant neoplasm of female breast, unspecified estrogen receptor status, unspecified laterality, unspecified site of breast  Hemorrhagic cystitis: Hemorrhagic cystitis Patient is a 57y old  Female who presents with a chief complaint of hematuria (2018 23:13)      SUBJECTIVE / OVERNIGHT EVENTS:    Patient seen and examined. still having hematuria. back pain improved. tolerating PO. denies dysuria.      Vital Signs Last 24 Hrs  T(C): 36.7 (2018 07:37), Max: 36.8 (2018 16:17)  T(F): 98.1 (2018 07:37), Max: 98.2 (2018 16:17)  HR: 93 (2018 07:37) (85 - 93)  BP: 100/62 (2018 08:08) (96/65 - 101/68)  BP(mean): --  RR: 18 (2018 07:37) (18 - 18)  SpO2: 98% (2018 07:37) (98% - 99%)  I&O's Summary    2018 07:01  -  2018 07:00  --------------------------------------------------------  IN: 720 mL / OUT: 1 mL / NET: 719 mL        PE:  GENERAL: NAD, AAOx3  HEAD:  Atraumatic, Normocephalic  EYES: EOMI, PERRLA, conjunctiva and sclera clear  NECK: Supple, No JVD  CHEST/LUNG: CTABL, No wheeze  HEART: Regular rate and rhythm; no  murmur  ABDOMEN: Soft, Nontender, Nondistended; Bowel sounds present  gu: no cva tenderness  EXTREMITIES:  2+ Peripheral Pulses, No clubbing, cyanosis, or edema  SKIN: No rashes or lesions  NEURO: No focal deficits    LABS:                        8.9    17.85 )-----------( 107      ( 2018 08:25 )             26.6     07-02    140  |  102  |  12  ----------------------------<  107<H>  3.2<L>   |  27  |  0.79    Ca    8.5      2018 07:07  Phos  2.8     07-  Mg     1.3     07-01    TPro  7.0  /  Alb  4.4  /  TBili  0.7  /  DBili  x   /  AST  34  /  ALT  37  /  AlkPhos  101  06-30    PT/INR - ( 2018 20:31 )   PT: 12.7 sec;   INR: 1.16 ratio         PTT - ( 2018 20:31 )  PTT:26.1 sec  CAPILLARY BLOOD GLUCOSE            Urinalysis Basic - ( 2018 20:31 )    Color: Red / Appearance: Turbid / S.017 / pH: x  Gluc: x / Ketone: Trace  / Bili: Negative / Urobili: Negative   Blood: x / Protein: 300 mg/dL / Nitrite: Negative   Leuk Esterase: Moderate / RBC: >50 /HPF / WBC 26-50 /HPF   Sq Epi: x / Non Sq Epi: x / Bacteria: Moderate /HPF        RADIOLOGY & ADDITIONAL TESTS:    Imaging Personally Reviewed:  [x] YES  [ ] NO    Consultant(s) Notes Reviewed:  [x] YES  [ ] NO    MEDICATIONS  (STANDING):  cefTRIAXone   IVPB 1 Gram(s) IV Intermittent every 24 hours    MEDICATIONS  (PRN):  acetaminophen   Tablet. 650 milliGRAM(s) Oral every 6 hours PRN Mild Pain (1 - 3)      Care Discussed with Consultants/Other Providers [x] YES  [ ] NO    HEALTH ISSUES - PROBLEM Dx:  Leukocytosis, unspecified type: Leukocytosis, unspecified type  Breast cancer: Breast cancer  Malignant neoplasm of overlapping sites of left breast in female, estrogen receptor positive: Malignant neoplasm of overlapping sites of left breast in female, estrogen receptor positive  Hematuria: Hematuria  Arthralgia of temporomandibular joint, unspecified laterality: Arthralgia of temporomandibular joint, unspecified laterality  Anorexia: Anorexia  Diarrhea, unspecified type: Diarrhea, unspecified type  Anemia due to other cause: Anemia due to other cause  Malignant neoplasm of female breast, unspecified estrogen receptor status, unspecified laterality, unspecified site of breast: Malignant neoplasm of female breast, unspecified estrogen receptor status, unspecified laterality, unspecified site of breast  Hemorrhagic cystitis: Hemorrhagic cystitis

## 2018-07-02 NOTE — PROGRESS NOTE ADULT - PROBLEM SELECTOR PLAN 1
unlikely hemorrhagic cystitis  cont ceftriaxone 1 gram daily  urine culture neg  check US kidneys and bladder ro stone  outpt fu with urology as no gross hematuria  appreciate ID recs unlikely hemorrhagic cystitis  cont ceftriaxone 1 gram daily as on chemo and unable to fully rule out infection  urine culture neg  check CT abd without contrast  urology follow up for continued gross hematuria  appreciate ID recs

## 2018-07-02 NOTE — PROGRESS NOTE ADULT - ASSESSMENT
57 year old woman with ER/FL positive, [id number] positive breast cancer undergoing neoadjuvant chemotherapy with TCHP admitted with hematuria.    7/2 case discussed with Dr Escobar and pt completed chemotherapy 5 days ago and I will have to find out the type of chemo given. Hematuria painless in nature continues and kidney stone may be the cause and urology is on the case.

## 2018-07-02 NOTE — PROGRESS NOTE ADULT - SUBJECTIVE AND OBJECTIVE BOX
infectious diseases progress note:    MAC HAMILTON is a 57y y. o. Female patient    Patient reports: really concerned because I feel like I am hemorrhaging into my urine    ROS:    EYES:  Negative  blurry vision or double vision  GASTROINTESTINAL:  Negative for nausea, vomiting,   -otherwise negative except for subjective    Allergies    No Known Allergies    Intolerances        ANTIBIOTICS/RELEVANT:  antimicrobials  cefTRIAXone   IVPB 1 Gram(s) IV Intermittent every 24 hours    immunologic:    OTHER:  acetaminophen   Tablet. 650 milliGRAM(s) Oral every 6 hours PRN  magnesium sulfate  IVPB 2 Gram(s) IV Intermittent once  potassium chloride    Tablet ER 40 milliEquivalent(s) Oral every 4 hours      Objective:  Vital Signs Last 24 Hrs  T(C): 36.7 (2018 07:37), Max: 36.8 (2018 16:17)  T(F): 98.1 (2018 07:37), Max: 98.2 (2018 16:17)  HR: 93 (2018 07:37) (85 - 93)  BP: 100/62 (2018 08:08) (96/65 - 101/68)  BP(mean): --  RR: 18 (2018 07:37) (18 - 18)  SpO2: 98% (2018 07:37) (98% - 99%)    T(C): 36.7 (18 @ 07:37), Max: 36.8 (18 @ 19:45)  T(C): 36.7 (18 @ 07:37), Max: 36.8 (18 @ 19:45)  T(C): 36.7 (18 @ 07:37), Max: 36.8 (18 @ 19:45)    PHYSICAL EXAM:  Constitutional: Well-developed, well nourished  Eyes: PERRLA, EOMI  Ear/Nose/Throat: oropharynx normal	  Neck: no JVD, no lymphadenopathy, supple  Respiratory: no accessory muscle use  Cardiovascular: RRR,   Gastrointestinal: soft, NT  Extremities: no clubbing, no cyanosis, edema absent      LABS:                        8.9    17. )-----------( 107      ( 2018 08:25 )             26.6       17.85 07 @ 08:25  35.9 0630 @ 20:31          140  |  102  |  12  ----------------------------<  107<H>  3.2<L>   |  27  |  0.79    Ca    8.5      2018 07:07  Phos  2.8       Mg     1.5         TPro  7.0  /  Alb  4.4  /  TBili  0.7  /  DBili  x   /  AST  34  /  ALT  37  /  AlkPhos  101        Creatinine, Serum: 0.79 mg/dL (18 @ 07:07)  Creatinine, Serum: 0.95 mg/dL (18 @ 09:25)  Creatinine, Serum: 0.84 mg/dL (18 @ 06:17)  Creatinine, Serum: 1.13 mg/dL (18 @ 20:31)      PT/INR - ( 2018 20:31 )   PT: 12.7 sec;   INR: 1.16 ratio         PTT - ( 2018 20:31 )  PTT:26.1 sec  Urinalysis Basic - ( 2018 20:31 )    Color: Red / Appearance: Turbid / S.017 / pH: x  Gluc: x / Ketone: Trace  / Bili: Negative / Urobili: Negative   Blood: x / Protein: 300 mg/dL / Nitrite: Negative   Leuk Esterase: Moderate / RBC: >50 /HPF / WBC 26-50 /HPF   Sq Epi: x / Non Sq Epi: x / Bacteria: Moderate /HPF      MICROBIOLOGY:  Culture Results:   Testing in progress ( @ 21:28)    Culture - Urine (18 @ 00:18)    Specimen Source: .Urine Clean Catch (Midstream)    Culture Results:   No growth        RADIOLOGY & ADDITIONAL STUDIES:

## 2018-07-02 NOTE — PROGRESS NOTE ADULT - ASSESSMENT
57 f with breast cancer undergoing chemo a/w hematuria and concern for acute hemorrhagic cystitis 57 f with breast cancer undergoing chemo a/w hematuria.

## 2018-07-02 NOTE — PROGRESS NOTE ADULT - PROBLEM SELECTOR PLAN 1
Urine is currently pink and patient reports no symptoms suggestive of uti. Discussed with Dr Contreras concerns for noninfectious etiologies and agree with CT renal stone hunt. Continue abx for now but will look at limiting abx based on micro data and clinical course.

## 2018-07-02 NOTE — PROGRESS NOTE ADULT - SUBJECTIVE AND OBJECTIVE BOX
[facility] Hematology/Oncology - Kecia Dhaliwal / Preston / Shawn - [phone]  Primary Outpatient Hematologist/Oncologist: Dr. Nicola Steen    Chief Complaint:  Hematuria    HPI:  Patient is a 57 year old woman with history of breast cancer who is undergoing neoadjuvant chemotherapy who presents with hematuria. Reports hematuria started on Friday and also reports having profound diarrhea. Urine was intially dark red but it has not turned pink. She also reports abdominal pain and severe back pain intermittently for the last two days. No other complaints 7/2 case discussed with Dr Escobar and pt completed chemotherapy 5 days ago and I will have to find out the type of chemo given. Hematuria painless in nature continues and kidney stone may be the cause and urology is on the case.    Hem/Onc History  Stage II breast cancer. Left breast mass. 4.2cm mass with multiple smaller surrounding masses. Dx 2/2018. ER 90%, AR 0%, [id number] 2/3+ with amplification on FISH. On neoadjuvant chemotherapy with Carboplatin, taxol, herceptin, and perjecta every 3 weeks for 6 doses prior to surgery. s/p 5 of 6 doses with last dose given on 6/27/2018 with onPro Neulasta injection.     ROS:  General:  (+)Pain, (-)Decrease appeite, (-)Fevers, (-)Chills, (-)Weight Loss  Eyes: (-)Blurry Vision, (-)Double Vision, (-)Vision Loss  ENT: (-)Sinus Congestion, (-)Decreased Hearing, (-)Nosebleeds, (-)Sore Throat  Cardiac: (-)Chest Pain, (-)Palpitations, (-)Shortness of breathe on exertion  Respiratory: (-)Cough, (-)hemoptysis, (-)Shortness of breathe  GI: (-)Nausea, (-)Vomiting, (-)Diarrhea, (-)Constipation, (-)Melena, (-)BRBPR  : (+)Hematuria, (-)Dysuria, (-)Polyuia  MSK: (-)Back pain, (-)Joint pain, (-)Stiffness  Dermatology: (-)Rash, (-)Itching  Neurology:  (-)Numbness, (-)Tingling, (-)Difficulty Walking, (-)Tremors, (+)Weakness  Psych: (-)Anxiety, (-)Depression, (-)Memory Loss  Hematology:  (-)Easy Bruising, (-)Easy Bleeding, (-)Night Sweats, (+)Hematuria    PAST MEDICAL & SURGICAL HISTORY:  Murmur, heart  Asthma  Breast cancer  H/O abdominal hysterectomy    Social History  Tobacco: Denies current use  Alcohol: Denies current use  Drugs:  Denies current use    FAMILY HISTORY:  Family history of CHF (congestive heart failure) (Mother)    MEDICATIONS  (STANDING):  cefTRIAXone   IVPB 1 Gram(s) IV Intermittent every 24 hours  potassium chloride   Solution 40 milliEquivalent(s) Oral every 4 hours    MEDICATIONS  (PRN):  acetaminophen   Tablet. 650 milliGRAM(s) Oral every 6 hours PRN Mild Pain (1 - 3)    Physical Exam:  General: AAO x 3. NAD. Lying in bed comfortably.  HEENT: clear oropharynx, anicteric sclera, pink conjunctivae, EOMi. PERRLA  Cardiac: S1, S2 present. No audible murmurs. RRR  Lungs: CTA B/L.   Abdomen: Soft, Non-Tender, Non-Distended. No palpable hepatosplenomegaly  Extremities: 2+ pulses. No edema  Skin: No Rashes. No petechiae  Neuro: No focal motor or sensory deficits.     CBC Full  -  ( 02 Jul 2018 09:29 )  WBC Count : 7.17 K/uL  Hemoglobin : 9.6 g/dL  Hematocrit : 29.0 %  Platelet Count - Automated : 98 K/uL  Mean Cell Volume : 99.0 fl  Mean Cell Hemoglobin : 32.8 pg  Mean Cell Hemoglobin Concentration : 33.1 gm/dL  Auto Neutrophil # : x  Auto Lymphocyte # : x  Auto Monocyte # : x  Auto Eosinophil # : x  Auto Basophil # : x  Auto Neutrophil % : x  Auto Lymphocyte % : x  Auto Monocyte % : x  Auto Eosinophil % : x  Auto Basophil % : x    07-01    142  |  103  |  16  ----------------------------<  119<H>  3.5   |  25  |  0.95    Ca    8.2<L>      01 Jul 2018 09:25  Phos  2.1     07-01  Mg     1.3     07-01    TPro  7.0  /  Alb  4.4  /  TBili  0.7  /  DBili  x   /  AST  34  /  ALT  37  /  AlkPhos  101  06-30    PT/INR - ( 30 Jun 2018 20:31 )   PT: 12.7 sec;   INR: 1.16 ratio    PTT - ( 30 Jun 2018 20:31 )  PTT:26.1 sec    Urinalysis + Microscopic Examination (06.30.18 @ 20:31)    Color: Red    Urobilinogen: Negative    Specific Gravity: 1.017    Protein, Urine: 300 mg/dL    Glucose Qualitative, Urine: Trace    Blood, Urine: Large    Urine Appearance: Turbid    Bilirubin: Negative    pH Urine: 6.5    Leukocyte Esterase Concentration: Moderate    Nitrite: Negative    Ketone - Urine: Trace    Red Blood Cell - Urine: >50 /HPF    White Blood Cell - Urine: 26-50 /HPF    Bacteria: Moderate /HPF

## 2018-07-02 NOTE — PROGRESS NOTE ADULT - PROBLEM SELECTOR PLAN 4
likely 2nd chemo  c diff neg  fu GI PCR likely 2nd chemo  repleate k and mg aggressively  c diff neg  fu GI PCR

## 2018-07-03 LAB
ANION GAP SERPL CALC-SCNC: 11 MMOL/L — SIGNIFICANT CHANGE UP (ref 5–17)
BUN SERPL-MCNC: 13 MG/DL — SIGNIFICANT CHANGE UP (ref 7–23)
CALCIUM SERPL-MCNC: 9.2 MG/DL — SIGNIFICANT CHANGE UP (ref 8.4–10.5)
CHLORIDE SERPL-SCNC: 103 MMOL/L — SIGNIFICANT CHANGE UP (ref 96–108)
CO2 SERPL-SCNC: 26 MMOL/L — SIGNIFICANT CHANGE UP (ref 22–31)
CREAT SERPL-MCNC: 0.87 MG/DL — SIGNIFICANT CHANGE UP (ref 0.5–1.3)
CULTURE RESULTS: SIGNIFICANT CHANGE UP
GLUCOSE SERPL-MCNC: 116 MG/DL — HIGH (ref 70–99)
HCT VFR BLD CALC: 31.5 % — LOW (ref 34.5–45)
HGB BLD-MCNC: 10.2 G/DL — LOW (ref 11.5–15.5)
MCHC RBC-ENTMCNC: 32 PG — SIGNIFICANT CHANGE UP (ref 27–34)
MCHC RBC-ENTMCNC: 32.4 GM/DL — SIGNIFICANT CHANGE UP (ref 32–36)
MCV RBC AUTO: 98.7 FL — SIGNIFICANT CHANGE UP (ref 80–100)
PLATELET # BLD AUTO: 100 K/UL — LOW (ref 150–400)
POTASSIUM SERPL-MCNC: 4 MMOL/L — SIGNIFICANT CHANGE UP (ref 3.5–5.3)
POTASSIUM SERPL-SCNC: 4 MMOL/L — SIGNIFICANT CHANGE UP (ref 3.5–5.3)
RBC # BLD: 3.19 M/UL — LOW (ref 3.8–5.2)
RBC # FLD: 15.1 % — HIGH (ref 10.3–14.5)
SODIUM SERPL-SCNC: 140 MMOL/L — SIGNIFICANT CHANGE UP (ref 135–145)
SPECIMEN SOURCE: SIGNIFICANT CHANGE UP
WBC # BLD: 4.12 K/UL — SIGNIFICANT CHANGE UP (ref 3.8–10.5)
WBC # FLD AUTO: 4.12 K/UL — SIGNIFICANT CHANGE UP (ref 3.8–10.5)

## 2018-07-03 PROCEDURE — 74177 CT ABD & PELVIS W/CONTRAST: CPT | Mod: 26

## 2018-07-03 RX ADMIN — CEFTRIAXONE 100 GRAM(S): 500 INJECTION, POWDER, FOR SOLUTION INTRAMUSCULAR; INTRAVENOUS at 03:47

## 2018-07-03 NOTE — PROGRESS NOTE ADULT - PROBLEM SELECTOR PLAN 1
Urine is currently pink and patient reports no symptoms suggestive of uti.     CT scan shows nephrolithisasis   with mild right hydronephrosis  urine cx negative.  doubt infectious etiology  d/c ceftriaxone s/p 4 days  monitor off antibx

## 2018-07-03 NOTE — PROGRESS NOTE ADULT - SUBJECTIVE AND OBJECTIVE BOX
Patient is a 57y old  Female who presents with a chief complaint of hematuria (30 Jun 2018 23:13)      SUBJECTIVE / OVERNIGHT EVENTS:    Patient seen and examined. hematuria improving. still some discomfort abd.       Vital Signs Last 24 Hrs  T(C): 37.1 (03 Jul 2018 07:43), Max: 37.1 (03 Jul 2018 07:43)  T(F): 98.7 (03 Jul 2018 07:43), Max: 98.7 (03 Jul 2018 07:43)  HR: 80 (03 Jul 2018 07:43) (60 - 80)  BP: 99/65 (03 Jul 2018 07:43) (99/65 - 118/74)  BP(mean): --  RR: 18 (03 Jul 2018 07:43) (18 - 18)  SpO2: 96% (03 Jul 2018 07:43) (96% - 97%)  I&O's Summary    02 Jul 2018 07:01  -  03 Jul 2018 07:00  --------------------------------------------------------  IN: 420 mL / OUT: 300 mL / NET: 120 mL    03 Jul 2018 07:01  -  03 Jul 2018 10:36  --------------------------------------------------------  IN: 120 mL / OUT: 0 mL / NET: 120 mL        PE:  GENERAL: NAD, AAOx3  HEAD:  Atraumatic, Normocephalic  EYES: EOMI, PERRLA, conjunctiva and sclera clear  NECK: Supple, No JVD  CHEST/LUNG: CTABL, No wheeze  HEART: Regular rate and rhythm; + murmur  ABDOMEN: Soft, Nontender, Nondistended; Bowel sounds present  EXTREMITIES:  2+ Peripheral Pulses, No clubbing, cyanosis, or edema  SKIN: No rashes or lesions  NEURO: No focal deficits    LABS:                        10.2   4.12  )-----------( 100      ( 03 Jul 2018 08:12 )             31.5     07-03    140  |  103  |  13  ----------------------------<  116<H>  4.0   |  26  |  0.87    Ca    9.2      03 Jul 2018 07:21  Mg     2.0     07-02    TPro  6.3  /  Alb  4.0  /  TBili  0.3  /  DBili  x   /  AST  27  /  ALT  44  /  AlkPhos  86  07-02      CAPILLARY BLOOD GLUCOSE                RADIOLOGY & ADDITIONAL TESTS:    Imaging Personally Reviewed:  [x] YES  [ ] NO    Consultant(s) Notes Reviewed:  [x] YES  [ ] NO    MEDICATIONS  (STANDING):  cefTRIAXone   IVPB 1 Gram(s) IV Intermittent every 24 hours    MEDICATIONS  (PRN):  acetaminophen   Tablet. 650 milliGRAM(s) Oral every 6 hours PRN Mild Pain (1 - 3)      Care Discussed with Consultants/Other Providers [x] YES  [ ] NO    HEALTH ISSUES - PROBLEM Dx:  Acute cystitis with hematuria: Acute cystitis with hematuria  Leukocytosis, unspecified type: Leukocytosis, unspecified type  Breast cancer: Breast cancer  Malignant neoplasm of overlapping sites of left breast in female, estrogen receptor positive: Malignant neoplasm of overlapping sites of left breast in female, estrogen receptor positive  Hematuria: Hematuria  Arthralgia of temporomandibular joint, unspecified laterality: Arthralgia of temporomandibular joint, unspecified laterality  Anorexia: Anorexia  Diarrhea, unspecified type: Diarrhea, unspecified type  Anemia due to other cause: Anemia due to other cause  Malignant neoplasm of female breast, unspecified estrogen receptor status, unspecified laterality, unspecified site of breast: Malignant neoplasm of female breast, unspecified estrogen receptor status, unspecified laterality, unspecified site of breast  Hemorrhagic cystitis: Hemorrhagic cystitis

## 2018-07-03 NOTE — PROGRESS NOTE ADULT - SUBJECTIVE AND OBJECTIVE BOX
[facility], Division of Infectious Diseases  ALFREDO Strong A. Lee  [phone]  Name: MAC HAMILTON  Age: 57y  Gender: Female  MRN: [id number]    Interval History--  Notes reviewed  states urine is still tinged with blood.  having loose stool occasionally. denies back pain    Past Medical History--  Murmur, heart  Asthma  Breast cancer  H/O abdominal hysterectomy      For details regarding the patient's social history, family history, and other miscellaneous elements, please refer the initial infectious diseases consultation and/or the admitting history and physical examination for this admission.    Allergies    No Known Allergies    Intolerances        Medications--  Antibiotics:  cefTRIAXone   IVPB 1 Gram(s) IV Intermittent every 24 hours    Immunologic:    Other:  acetaminophen   Tablet. PRN      Review of Systems--  A 10-point review of systems was obtained.     Pertinent positives and negatives--  Constitutional: No fevers. No Chills. No Rigors.   Cardiovascular: No chest pain. No palpitations.  Respiratory: No shortness of breath. No cough.  Gastrointestinal: No nausea or vomiting. No diarrhea or constipation.   Psychiatric: no depression    Review of systems otherwise negative except as previously noted.    Physical Examination--  Vital Signs: T(F): 98.7 (07-03-18 @ 07:43), Max: 98.7 (07-03-18 @ 07:43)  HR: 80 (07-03-18 @ 07:43)  BP: 99/65 (07-03-18 @ 07:43)  RR: 18 (07-03-18 @ 07:43)  SpO2: 96% (07-03-18 @ 07:43)  Wt(kg): --  General: Nontoxic-appearing Female in no acute distress.  HEENT: AT/NC.I. Anicteric. Conjunctiva pink and moist. Oropharynx clear. Dentition fair.  Neck: Not rigid. No sense of mass.  Nodes: None palpable.  Lungs: Clear bilaterally without rales, wheezing or rhonchi  Heart: Regular rate and rhythm. No Murmur.  Abdomen: Bowel sounds present and normoactive. Soft. Nondistended. Nontender.   Back: No spinal tenderness. No costovertebral angle tenderness.   Extremities: No cyanosis or clubbing. No edema.   Skin: Warm. Dry. Good turgor. No rash. No vasculitic stigmata.  Psychiatric: Appropriate affect and mood for situation.         Laboratory Studies--  CBC                        10.2   4.12  )-----------( 100      ( 03 Jul 2018 08:12 )             31.5       Chemistries  07-03    140  |  103  |  13  ----------------------------<  116<H>  4.0   |  26  |  0.87    Ca    9.2      03 Jul 2018 07:21  Mg     2.0     07-02    TPro  6.3  /  Alb  4.0  /  TBili  0.3  /  DBili  x   /  AST  27  /  ALT  44  /  AlkPhos  86  07-02      Culture Data    Culture - Stool (collected 01 Jul 2018 16:38)  Source: .Stool Feces  Preliminary Report (02 Jul 2018 21:55):    No enteric pathogens to date: Final culture pending    Culture - Urine (collected 01 Jul 2018 00:18)  Source: .Urine Clean Catch (Midstream)  Final Report (01 Jul 2018 21:59):    No growth        < from: CT Abdomen and Pelvis No Cont (07.02.18 @ 15:36) >    EXAM:  CT ABDOMEN AND PELVIS                            PROCEDURE DATE:  07/02/2018            INTERPRETATION:  CLINICAL INFORMATION: Diarrhea and hematuria. Breast   carcinoma on chemotherapy.    COMPARISON: None.    PROCEDURE:   CT of the Abdomenand Pelvis was performed without intravenous contrast.   Intravenous contrast: None.  Oral contrast: None.  Sagittal and coronal reformats were performed.    Evaluation of the solid visceral organs is limited without intravenous   contrast.    FINDINGS:    LOWER CHEST: Partially imaged asymmetric soft tissue density in the   lateral aspect of the left breast which may correspond with the patient's   known breast carcinoma.    LIVER: Within normal limits.  BILE DUCTS: Normal caliber.  GALLBLADDER:Cholelithiasis.  SPLEEN: Within normal limits.  PANCREAS: Within normal limits.  ADRENALS: Nodular thickening of the adrenal glands bilaterally.  KIDNEYS/URETERS: Nonobstructing right intrarenal calculi, the largest   measuring 0.5 cm in diameter. Mild right hydronephrosis and dilatation of   the proximal right ureter, of uncertain etiology.    BLADDER: Collapsed at the time of scanning, limiting evaluation.  REPRODUCTIVE ORGANS: Status post hysterectomy.    BOWEL: No bowel obstruction. Appendix is within normal limits.  PERITONEUM: No ascites.  VESSELS:  Atherosclerotic calcification.  RETROPERITONEUM: No lymphadenopathy.    ABDOMINAL WALL: Small fat-containing umbilical hernia.  BONES: A small sclerotic focus is noted in the left iliac bone.    IMPRESSION: Partially imaged asymmetric soft tissue density in the left   breast, which may correspond with the patient's known breast carcinoma.   Correlation with prior mammography is suggested.    Nonobstructing right intrarenal calculi.    Mild hydronephrosis of the right kidney and dilatation of the proximal   right ureter, of uncertain etiology. CT urogram may be helpful for   further evaluation as indicated. Limited evaluation of the urinary   bladder which is collapsed at the time of scanning.    Cholelithiasis.      < end of copied text >

## 2018-07-03 NOTE — PROGRESS NOTE ADULT - SUBJECTIVE AND OBJECTIVE BOX
[facility] Hematology/Oncology - Kecia Dhaliwal / [place] / [place] - [phone]  Primary Outpatient Hematologist/Oncologist: Dr. Nicola Steen    Chief Complaint:  Hematuria    HPI:  Patient is a 57 year old woman with history of breast cancer who is undergoing neoadjuvant chemotherapy who presents with hematuria. Reports hematuria started on Friday and also reports having profound diarrhea. Urine was intially dark red but it has not turned pink. She also reports abdominal pain and severe back pain intermittently for the last two days. No other complaints 7/2 case discussed with Dr Escobar and pt completed chemotherapy 5 days ago and I will have to find out the type of chemo given. Hematuria painless in nature continues and kidney stone may be the cause and urology is on the case.  7/3 I called Dr Rivera office and the pt received chemotherapy with TCHP and this was given on 6/27 and this was 6 days ago. She did get Neulasta so I do not think the counts will be affected that much here.   Hem/Onc History  Stage II breast cancer. Left breast mass. 4.2cm mass with multiple smaller surrounding masses. Dx 2/2018. ER 90%, ID 0%, Ioj4mmh 2/3+ with amplification on FISH. On neoadjuvant chemotherapy with Carboplatin, taxol, herceptin, and perjecta every 3 weeks for 6 doses prior to surgery. s/p 5 of 6 doses with last dose given on 6/27/2018 with onPro Neulasta injection.     ROS:  General:  (+)Pain, (-)Decrease appeite, (-)Fevers, (-)Chills, (-)Weight Loss  Eyes: (-)Blurry Vision, (-)Double Vision, (-)Vision Loss  ENT: (-)Sinus Congestion, (-)Decreased Hearing, (-)Nosebleeds, (-)Sore Throat  Cardiac: (-)Chest Pain, (-)Palpitations, (-)Shortness of breathe on exertion  Respiratory: (-)Cough, (-)hemoptysis, (-)Shortness of breathe  GI: (-)Nausea, (-)Vomiting, (-)Diarrhea, (-)Constipation, (-)Melena, (-)BRBPR  : (+)Hematuria, (-)Dysuria, (-)Polyuia  MSK: (-)Back pain, (-)Joint pain, (-)Stiffness  Dermatology: (-)Rash, (-)Itching  Neurology:  (-)Numbness, (-)Tingling, (-)Difficulty Walking, (-)Tremors, (+)Weakness  Psych: (-)Anxiety, (-)Depression, (-)Memory Loss  Hematology:  (-)Easy Bruising, (-)Easy Bleeding, (-)Night Sweats, (+)Hematuria    PAST MEDICAL & SURGICAL HISTORY:  Murmur, heart  Asthma  Breast cancer  H/O abdominal hysterectomy    Social History  Tobacco: Denies current use  Alcohol: Denies current use  Drugs:  Denies current use    FAMILY HISTORY:  Family history of CHF (congestive heart failure) (Mother)    MEDICATIONS  (STANDING):  cefTRIAXone   IVPB 1 Gram(s) IV Intermittent every 24 hours  potassium chloride   Solution 40 milliEquivalent(s) Oral every 4 hours    MEDICATIONS  (PRN):  acetaminophen   Tablet. 650 milliGRAM(s) Oral every 6 hours PRN Mild Pain (1 - 3)    Physical Exam:pt was off the floor at the time of my visit.  General: AAO x 3. NAD. Lying in bed comfortably.  HEENT: clear oropharynx, anicteric sclera, pink conjunctivae, EOMi. PERRLA  Cardiac: S1, S2 present. No audible murmurs. RRR  Lungs: CTA B/L.   Abdomen: Soft, Non-Tender, Non-Distended. No palpable hepatosplenomegaly  Extremities: 2+ pulses. No edema  Skin: No Rashes. No petechiae  Neuro: No focal motor or sensory deficits.     CBC Full  -  ( 02 Jul 2018 09:29 )  WBC Count : 7.17 K/uL  Hemoglobin : 9.6 g/dL  Hematocrit : 29.0 %  Platelet Count - Automated : 98 K/uL  Mean Cell Volume : 99.0 fl  Mean Cell Hemoglobin : 32.8 pg  Mean Cell Hemoglobin Concentration : 33.1 gm/dL  Auto Neutrophil # : x  Auto Lymphocyte # : x  Auto Monocyte # : x  Auto Eosinophil # : x  Auto Basophil # : x  Auto Neutrophil % : x  Auto Lymphocyte % : x  Auto Monocyte % : x  Auto Eosinophil % : x  Auto Basophil % : x    07-01    142  |  103  |  16  ----------------------------<  119<H>  3.5   |  25  |  0.95    Ca    8.2<L>      01 Jul 2018 09:25  Phos  2.1     07-01  Mg     1.3     07-01    TPro  7.0  /  Alb  4.4  /  TBili  0.7  /  DBili  x   /  AST  34  /  ALT  37  /  AlkPhos  101  06-30    PT/INR - ( 30 Jun 2018 20:31 )   PT: 12.7 sec;   INR: 1.16 ratio    PTT - ( 30 Jun 2018 20:31 )  PTT:26.1 sec    Urinalysis + Microscopic Examination (06.30.18 @ 20:31)    Color: Red    Urobilinogen: Negative    Specific Gravity: 1.017    Protein, Urine: 300 mg/dL    Glucose Qualitative, Urine: Trace    Blood, Urine: Large    Urine Appearance: Turbid    Bilirubin: Negative    pH Urine: 6.5    Leukocyte Esterase Concentration: Moderate    Nitrite: Negative    Ketone - Urine: Trace    Red Blood Cell - Urine: >50 /HPF    White Blood Cell - Urine: 26-50 /HPF    Bacteria: Moderate /HPF

## 2018-07-03 NOTE — PROGRESS NOTE ADULT - ASSESSMENT
57 year old woman with ER/AZ positive, [id number] positive breast cancer undergoing neoadjuvant chemotherapy with TCHP admitted with hematuria.    7/2 case discussed with Dr Escobar and pt completed chemotherapy 5 days ago and I will have to find out the type of chemo given. Hematuria painless in nature continues and kidney stone may be the cause and urology is on the case.

## 2018-07-03 NOTE — PROGRESS NOTE ADULT - PROBLEM SELECTOR PLAN 1
unlikely hemorrhagic cystitis  cont ceftriaxone 1 gram daily as on chemo and unable to fully rule out infection  urine culture neg  CT abd without contrast showed right non obs renal stone and mild hydro  urology evaluated and no indication for inpt workup  will try to order CT urogram to further evaluate  appreciate ID recs

## 2018-07-03 NOTE — PROGRESS NOTE ADULT - ATTENDING COMMENTS
Dr. Cespedes will cover starint 7/4/18. Please call service [phone] with questions. Dr. Cespedes will cover staring 7/4/18. Please call service [phone] with questions.

## 2018-07-03 NOTE — PROGRESS NOTE ADULT - PROBLEM SELECTOR PLAN 4
likely 2nd chemo  c diff neg  GI PCR fu    dispo: anticipate DC in 24-48 hrs likely 2nd chemo  c diff neg  GI PCR fu  hypomag, hypophos, hypocal, hypokalemia likely 2/2 diarrhea  now improved and sp repletion    dispo: anticipate DC in 24-48 hrs

## 2018-07-04 LAB
ALBUMIN SERPL ELPH-MCNC: 4.7 G/DL — SIGNIFICANT CHANGE UP (ref 3.3–5)
ALP SERPL-CCNC: 87 U/L — SIGNIFICANT CHANGE UP (ref 40–120)
ALT FLD-CCNC: 43 U/L — SIGNIFICANT CHANGE UP (ref 10–45)
ANION GAP SERPL CALC-SCNC: 13 MMOL/L — SIGNIFICANT CHANGE UP (ref 5–17)
AST SERPL-CCNC: 21 U/L — SIGNIFICANT CHANGE UP (ref 10–40)
BILIRUB SERPL-MCNC: 0.4 MG/DL — SIGNIFICANT CHANGE UP (ref 0.2–1.2)
BUN SERPL-MCNC: 11 MG/DL — SIGNIFICANT CHANGE UP (ref 7–23)
CALCIUM SERPL-MCNC: 9.4 MG/DL — SIGNIFICANT CHANGE UP (ref 8.4–10.5)
CHLORIDE SERPL-SCNC: 101 MMOL/L — SIGNIFICANT CHANGE UP (ref 96–108)
CO2 SERPL-SCNC: 25 MMOL/L — SIGNIFICANT CHANGE UP (ref 22–31)
CREAT SERPL-MCNC: 0.89 MG/DL — SIGNIFICANT CHANGE UP (ref 0.5–1.3)
GLUCOSE SERPL-MCNC: 101 MG/DL — HIGH (ref 70–99)
HCT VFR BLD CALC: 32.3 % — LOW (ref 34.5–45)
HGB BLD-MCNC: 10.7 G/DL — LOW (ref 11.5–15.5)
MCHC RBC-ENTMCNC: 32.7 PG — SIGNIFICANT CHANGE UP (ref 27–34)
MCHC RBC-ENTMCNC: 33.1 GM/DL — SIGNIFICANT CHANGE UP (ref 32–36)
MCV RBC AUTO: 98.8 FL — SIGNIFICANT CHANGE UP (ref 80–100)
PLATELET # BLD AUTO: 108 K/UL — LOW (ref 150–400)
POTASSIUM SERPL-MCNC: 3.7 MMOL/L — SIGNIFICANT CHANGE UP (ref 3.5–5.3)
POTASSIUM SERPL-SCNC: 3.7 MMOL/L — SIGNIFICANT CHANGE UP (ref 3.5–5.3)
PROT SERPL-MCNC: 6.8 G/DL — SIGNIFICANT CHANGE UP (ref 6–8.3)
RBC # BLD: 3.27 M/UL — LOW (ref 3.8–5.2)
RBC # FLD: 14.8 % — HIGH (ref 10.3–14.5)
SODIUM SERPL-SCNC: 139 MMOL/L — SIGNIFICANT CHANGE UP (ref 135–145)
WBC # BLD: 5.66 K/UL — SIGNIFICANT CHANGE UP (ref 3.8–10.5)
WBC # FLD AUTO: 5.66 K/UL — SIGNIFICANT CHANGE UP (ref 3.8–10.5)

## 2018-07-04 NOTE — PROGRESS NOTE ADULT - SUBJECTIVE AND OBJECTIVE BOX
infectious diseases progress note:    MAC HAMILTON is a 57y y. o. Female patient    Patient reports: urine still pink but feeling fine off abx, still some loose stools    ROS:    EYES:  Negative  blurry vision or double vision  GASTROINTESTINAL:  Negative for nausea, vomiting  -otherwise negative except for subjective    Allergies    No Known Allergies    Intolerances        ANTIBIOTICS/RELEVANT:  antimicrobials    immunologic:    OTHER:  acetaminophen   Tablet. 650 milliGRAM(s) Oral every 6 hours PRN      Objective:  Vital Signs Last 24 Hrs  T(C): 36.4 (04 Jul 2018 08:33), Max: 36.8 (03 Jul 2018 15:40)  T(F): 97.6 (04 Jul 2018 08:33), Max: 98.3 (03 Jul 2018 15:40)  HR: 99 (04 Jul 2018 08:33) (90 - 99)  BP: 94/64 (04 Jul 2018 08:33) (94/64 - 114/69)  BP(mean): --  RR: 18 (04 Jul 2018 08:33) (18 - 18)  SpO2: 98% (04 Jul 2018 08:33) (98% - 99%)    T(C): 36.4 (07-04-18 @ 08:33), Max: 37.1 (07-03-18 @ 07:43)  T(C): 36.4 (07-04-18 @ 08:33), Max: 37.1 (07-03-18 @ 07:43)  T(C): 36.4 (07-04-18 @ 08:33), Max: 37.1 (07-03-18 @ 07:43)    PHYSICAL EXAM:  Constitutional: Well-developed, well nourished  Eyes: PERRLA, EOMI  Ear/Nose/Throat: oropharynx normal	  Neck: no JVD, no lymphadenopathy, supple  Respiratory: no accessory muscle use  Cardiovascular: RRR,   Gastrointestinal: soft, NT  Extremities: no clubbing, no cyanosis, edema absent      LABS:                        10.2   4.12  )-----------( 100      ( 03 Jul 2018 08:12 )             31.5       4.12 07-03 @ 08:12  7.17 07-02 @ 09:29  17.85 07-01 @ 08:25  35.9 06-30 @ 20:31      07-04    139  |  101  |  11  ----------------------------<  101<H>  3.7   |  25  |  0.89    Ca    9.4      04 Jul 2018 07:23  Mg     2.0     07-02    TPro  6.8  /  Alb  4.7  /  TBili  0.4  /  DBili  x   /  AST  21  /  ALT  43  /  AlkPhos  87  07-04      Creatinine, Serum: 0.89 mg/dL (07-04-18 @ 07:23)  Creatinine, Serum: 0.87 mg/dL (07-03-18 @ 07:21)  Creatinine, Serum: 0.89 mg/dL (07-02-18 @ 16:52)  Creatinine, Serum: 0.79 mg/dL (07-02-18 @ 07:07)  Creatinine, Serum: 0.95 mg/dL (07-01-18 @ 09:25)  Creatinine, Serum: 0.84 mg/dL (07-01-18 @ 06:17)  Creatinine, Serum: 1.13 mg/dL (06-30-18 @ 20:31)                MICROBIOLOGY:              RADIOLOGY & ADDITIONAL STUDIES:    < from: CT Abdomen and Pelvis w/ IV Cont (07.03.18 @ 14:07) >    EXAM:  CT ABDOMEN AND PELVIS IC                            PROCEDURE DATE:  07/03/2018            INTERPRETATION:  CLINICAL INFORMATION: Hematuria and diarrhea, history of   kidney stones. Patient completed neoadjuvant chemotherapy 5 days ago for   breast cancer. Previous CT showed mild hydronephrosis    COMPARISON: CT abdomen and pelvis 07/02/2018.    PROCEDURE:   CT of the Abdomen and Pelvis was performed with and without intravenous   contrast.   Precontrast, Nephrographic and Excretory phases were acquired.  Intravenous contrast: 90 ml Omnipaque 350. 10 ml discarded.  Oral contrast: None.  Sagittal and coronal reformats were performed.    FINDINGS:    LOWER CHEST: Redemonstration of asymmetric soft tissue density in the   lateral left breast, which may correspond to patient's known breast   cancer.    ABDOMEN:    LIVER: Within normal limits.  BILE DUCTS: Normal caliber.  GALLBLADDER: Cholelithiasis.  SPLEEN: Within normal limits.  PANCREAS: Within normal limits.  ADRENALS: Mild bilateral nodular thickening.    KIDNEYS/URETERS:   Right: 1.7 x 0.7 x 0.8 cm irregular filling defect extending from the   right upper pole calyx into the collecting system (series 7, image 27   series 10, image 42). The most distal aspect is linear (series 7, image   28). Filling defect/mass is not discretely apparent on noncontrast exam,   limiting evaluation for enhancement. As on prior study, there is mild   right renal pelvic fullness with abrupt narrowing at the UPJ, which may   be on the basisof UPJ obstruction. Stable nonobstructing subcentimeter   stones in the right kidney measuring up to 0.4 cm at the lower pole   (series 6, image 41).  Left: Normal left kidney without stones or hydronephrosis.       BLADDER: Normal.  REPRODUCTIVE ORGANS: Status post hysterectomy. No solid adnexal masses.    VISUALIZED PORTIONS:  BOWEL: Underdistention with mild to moderate thickening of the cecum and   ascending colon.  PERITONEUM: No ascites.  VESSELS: Atherosclerotic change of the abdominal aorta.  RETROPERITONEUM: No lymphadenopathy.    ABDOMINAL WALL: Small fat-containing umbilical hernia.  BONES: Scattered subcentimeter sclerotic foci throughout the pelvic bones   measuring up to 0.6 cm in the left iliac bone (11, 207).    IMPRESSION:  1.  1.7 cm irregular filling defect in the right upper pole calyx   extending into the right renal collecting system without definite   enhancement. In a patient with renal stones and gross hematuria,   differential includes hemorrhagic debris versus malignancy. Correlation   with urine cytology is recommended.  2.  Stable nonobstructing subcentimeter stones in the right kidney   measuring up to 0.4 cm at the lower pole.  3.  Mild to moderate thickening of the cecum and ascending colon. Given   history of recent chemotherapy, findings are concerning for typhlitis.  4.  Cholelithiasis.  5.  Redemonstration of asymmetric soft tissue density in the left breast,   may correspond to patient's known breast cancer.

## 2018-07-04 NOTE — PROGRESS NOTE ADULT - PROBLEM SELECTOR PLAN 1
Urine is currently pink and patient reports no symptoms suggestive of uti.     CT scan shows nephrolithiasis   with mild right hydronephrosis  urine cx negative.  doubt infectious etiology  monitor off antibx

## 2018-07-04 NOTE — PROGRESS NOTE ADULT - SUBJECTIVE AND OBJECTIVE BOX
Patient is a 57y old  Female who presents with a chief complaint of hematuria (30 Jun 2018 23:13)      INTERVAL HPI/OVERNIGHT EVENTS:c/o abd discomfort at times, diarrhea persists, denies any n/v, tolerating po well  no furthur hematuria      Vital Signs Last 24 Hrs  T(C): 36.4 (04 Jul 2018 08:33), Max: 36.8 (03 Jul 2018 15:40)  T(F): 97.6 (04 Jul 2018 08:33), Max: 98.3 (03 Jul 2018 15:40)  HR: 99 (04 Jul 2018 08:33) (90 - 99)  BP: 94/64 (04 Jul 2018 08:33) (94/64 - 114/69)  BP(mean): --  RR: 18 (04 Jul 2018 08:33) (18 - 18)  SpO2: 98% (04 Jul 2018 08:33) (98% - 99%)    acetaminophen   Tablet. 650 milliGRAM(s) Oral every 6 hours PRN      PHYSICAL EXAM:  GENERAL: NAD,   EYES: conjunctiva and sclera clear  ENMT: Moist mucous membranes  NECK: Supple, No JVD, Normal thyroid  NERVOUS SYSTEM:  Alert & Oriented X3,   CHEST/LUNG: Clear to auscultation bilaterally; No rales, rhonchi, wheezing, or rubs  HEART: Regular rate and rhythm; No murmurs, rubs, or gallops  ABDOMEN: Soft, Nontender, Nondistended; Bowel sounds present  EXTREMITIES:  2+ Peripheral Pulses, No clubbing, cyanosis, or edema  LYMPH: No lymphadenopathy noted  SKIN: No rashes or lesions    Consultant(s) Notes Reviewed:  [x ] YES  [ ] NO  Care Discussed with Consultants/Other Providers [ x] YES  [ ] NO    LABS:                        10.2   4.12  )-----------( 100      ( 03 Jul 2018 08:12 )             31.5     07-04    139  |  101  |  11  ----------------------------<  101<H>  3.7   |  25  |  0.89    Ca    9.4      04 Jul 2018 07:23  Mg     2.0     07-02    TPro  6.8  /  Alb  4.7  /  TBili  0.4  /  DBili  x   /  AST  21  /  ALT  43  /  AlkPhos  87  07-04        CAPILLARY BLOOD GLUCOSE                RADIOLOGY & ADDITIONAL TESTS:< from: CT Abdomen and Pelvis w/ IV Cont (07.03.18 @ 14:07) >  .  1.7 cm irregular filling defect in the right upper pole calyx   extending into the right renal collecting system without definite   enhancement. In a patient with renal stones and gross hematuria,   differential includes hemorrhagic debris versus malignancy. Correlation   with urine cytology is recommended.  2.  Stable nonobstructing subcentimeter stones in the right kidney   measuring up to 0.4 cm at the lower pole.  3.  Mild to moderate thickening of the cecum and ascending colon. Given   history of recent chemotherapy, findings are concerning for typhlitis.  4.  Cholelithiasis.  5.  Redemonstration of asymmetric soft tissue density in the left breast,   may correspond to patient's known breast cancer.    < end of copied text >      Imaging Personally Reviewed:  [ x] YES  [ ] NO

## 2018-07-04 NOTE — PROGRESS NOTE ADULT - PROBLEM SELECTOR PLAN 4
likely 2nd chemo  c diff neg  GI PCR fu, cxneg for enteric pathogens, o&p pending  hypomag, hypophos, hypocal, hypokalemia likely 2/2 diarrhea  now improved and sp repletion  CT shows typhlitis-c/w post chemo inflammation  if stool studies rule out infectious process, will consider starting immodium  for now monitor, lytes repletion

## 2018-07-04 NOTE — PROGRESS NOTE ADULT - PROBLEM SELECTOR PLAN 1
r/o hemorrhagic cystitis  s/p ceftriaxone 1 gram daily as   urine culture neg, monitor off abx  CT abd without contrast showed right non obs renal stone and mild hydro  urology evaluated and no indication for inpt workup  CT urogram results reviewed as above- f/u  and ID  appreciate ID recs- no e/o infectious process

## 2018-07-05 DIAGNOSIS — R19.7 DIARRHEA, UNSPECIFIED: ICD-10-CM

## 2018-07-05 LAB
HCT VFR BLD CALC: 33.1 % — LOW (ref 34.5–45)
HGB BLD-MCNC: 11 G/DL — LOW (ref 11.5–15.5)
MCHC RBC-ENTMCNC: 32.4 PG — SIGNIFICANT CHANGE UP (ref 27–34)
MCHC RBC-ENTMCNC: 33.2 GM/DL — SIGNIFICANT CHANGE UP (ref 32–36)
MCV RBC AUTO: 97.4 FL — SIGNIFICANT CHANGE UP (ref 80–100)
PLATELET # BLD AUTO: 122 K/UL — LOW (ref 150–400)
RBC # BLD: 3.4 M/UL — LOW (ref 3.8–5.2)
RBC # FLD: 14.7 % — HIGH (ref 10.3–14.5)
WBC # BLD: 8.17 K/UL — SIGNIFICANT CHANGE UP (ref 3.8–10.5)
WBC # FLD AUTO: 8.17 K/UL — SIGNIFICANT CHANGE UP (ref 3.8–10.5)

## 2018-07-05 PROCEDURE — 99232 SBSQ HOSP IP/OBS MODERATE 35: CPT

## 2018-07-05 PROCEDURE — 99233 SBSQ HOSP IP/OBS HIGH 50: CPT

## 2018-07-05 RX ORDER — SODIUM CHLORIDE 9 MG/ML
500 INJECTION INTRAMUSCULAR; INTRAVENOUS; SUBCUTANEOUS ONCE
Qty: 0 | Refills: 0 | Status: COMPLETED | OUTPATIENT
Start: 2018-07-05 | End: 2018-07-05

## 2018-07-05 RX ORDER — LOPERAMIDE HCL 2 MG
2 TABLET ORAL
Qty: 0 | Refills: 0 | Status: DISCONTINUED | OUTPATIENT
Start: 2018-07-05 | End: 2018-07-07

## 2018-07-05 RX ORDER — SODIUM CHLORIDE 9 MG/ML
1000 INJECTION INTRAMUSCULAR; INTRAVENOUS; SUBCUTANEOUS
Qty: 0 | Refills: 0 | Status: DISCONTINUED | OUTPATIENT
Start: 2018-07-05 | End: 2018-07-06

## 2018-07-05 RX ADMIN — SODIUM CHLORIDE 75 MILLILITER(S): 9 INJECTION INTRAMUSCULAR; INTRAVENOUS; SUBCUTANEOUS at 14:36

## 2018-07-05 RX ADMIN — SODIUM CHLORIDE 75 MILLILITER(S): 9 INJECTION INTRAMUSCULAR; INTRAVENOUS; SUBCUTANEOUS at 22:36

## 2018-07-05 RX ADMIN — SODIUM CHLORIDE 500 MILLILITER(S): 9 INJECTION INTRAMUSCULAR; INTRAVENOUS; SUBCUTANEOUS at 14:35

## 2018-07-05 NOTE — PROGRESS NOTE ADULT - PROBLEM SELECTOR PLAN 4
suspect inflammatory post chemo and started imodium with neg infectious workup. Did order feces GI pcr but noted negative stool cultures to date and patient reporting this is her common chemo side-effect.    On Friday and over the weekend Dr. Ajay Trejo will be covering for our group. If you have any questions please reach out to him at [phone].

## 2018-07-05 NOTE — PROGRESS NOTE ADULT - PROBLEM SELECTOR PLAN 4
likely 2nd chemo  c diff neg, cxneg for enteric pathogens, o&p pending  hypomag, hypophos, hypocal, hypokalemia likely 2/2 diarrhea  d/w ID- immodium started as infection ruled out  monitor BM, lytes repletion  will start ivf with k

## 2018-07-05 NOTE — PROGRESS NOTE ADULT - SUBJECTIVE AND OBJECTIVE BOX
Patient is a 57y old  Female who presents with a chief complaint of hematuria (30 Jun 2018 23:13)      INTERVAL HPI/OVERNIGHT EVENTS: noted, c/o non resolving diarrhea, unsure if she saw some bld  c/o weakness and dizziness d/t diarrhea      Vital Signs Last 24 Hrs  T(C): 36.7 (05 Jul 2018 08:07), Max: 36.8 (04 Jul 2018 15:34)  T(F): 98 (05 Jul 2018 08:07), Max: 98.3 (04 Jul 2018 21:34)  HR: 99 (05 Jul 2018 08:07) (92 - 101)  BP: 104/60 (05 Jul 2018 11:51) (100/71 - 113/76)  BP(mean): 84 (05 Jul 2018 11:51) (84 - 84)  RR: 18 (05 Jul 2018 08:07) (18 - 18)  SpO2: 99% (05 Jul 2018 08:07) (99% - 99%)    acetaminophen   Tablet. 650 milliGRAM(s) Oral every 6 hours PRN  loperamide 2 milliGRAM(s) Oral four times a day PRN  sodium chloride 0.9% Bolus 500 milliLiter(s) IV Bolus once  sodium chloride 0.9%. 1000 milliLiter(s) IV Continuous <Continuous>      PHYSICAL EXAM:  GENERAL: NAD,   EYES: conjunctiva and sclera clear  ENMT: Moist mucous membranes  NECK: Supple, No JVD, Normal thyroid  NERVOUS SYSTEM:  Alert & Oriented X3,   CHEST/LUNG: Clear to auscultation bilaterally; No rales, rhonchi, wheezing, or rubs  HEART: Regular rate and rhythm; No murmurs, rubs, or gallops  ABDOMEN: Soft, Nontender, Nondistended; Bowel sounds present  EXTREMITIES:  2+ Peripheral Pulses, No clubbing, cyanosis, or edema  LYMPH: No lymphadenopathy noted  SKIN: No rashes or lesions    Consultant(s) Notes Reviewed:  [x ] YES  [ ] NO  Care Discussed with Consultants/Other Providers [ x] YES  [ ] NO    LABS:                        11.0   8.17  )-----------( 122      ( 05 Jul 2018 09:37 )             33.1     07-04    139  |  101  |  11  ----------------------------<  101<H>  3.7   |  25  |  0.89    Ca    9.4      04 Jul 2018 07:23    TPro  6.8  /  Alb  4.7  /  TBili  0.4  /  DBili  x   /  AST  21  /  ALT  43  /  AlkPhos  87  07-04        CAPILLARY BLOOD GLUCOSE                RADIOLOGY & ADDITIONAL TESTS:    Imaging Personally Reviewed:  [x ] YES  [ ] NO

## 2018-07-05 NOTE — CHART NOTE - NSCHARTNOTEFT_GEN_A_CORE
S= dizziness and orthostatic    HPI:  57 f with breast cancer on docetaxel and carboplatin last dose Wednesday p/w hematuria - initially dark red yesterday followed by an episode of profuse diarrhea then  pinkish colored urine all day today.    Overall pt states she has been feeling very weak and fatigued since chemo session 3 weeks ago and has developed extreme sensitivity of abdomen to light touch (clothing, etc) though no actual pain.  No f/c.  Poor appetite with poor taste for food.  Gagging on medications after chemo (takes a brief course of decadron).  + watery diarrhea surrounding chemo a few times a day with waxing/waning course each day.  No n/v.  + abdominal bloating.  Pt denies dysuria, urinary frequency, urinary hesitancy and urinary urgency.  LMP at 41 yo.      Of note pt states that while chemo was running on Wednesday she felt a brief episode of chest pain.  Has never had cp before.  Was told likely 2nd to chemo as resolved immediately after chemo infused.  Pt mother  of CHF.  Pt had a TTE within the last 6 months.  no sob/cough.  Pt states she has once more chemo session left and planned for mastectomy in August.    In ED received keflex 500mg po and then ctx 1 g with NS 2L. (2018 23:13)  Vital Signs Last 24 Hrs  T(C): 36.8 (2018 15:36), Max: 36.8 (2018 21:34)  T(F): 98.2 (2018 15:36), Max: 98.3 (2018 21:34)  HR: 90 (2018 15:36) (90 - 99)  BP: 103/72 (2018 15:36) (100/71 - 104/60)  BP(mean): 84 (2018 11:51) (84 - 84)  RR: 18 (2018 15:36) (18 - 18)  SpO2: 99% (2018 15:36) (99% - 99%)    PE: awake and alert x3  c/o of bouts diarrhea and weakness.  heent: acceptable , except macular hyperpigmented lesions  cv: S1 S2, regular rhythm  lungs : decreased at bases  GI ; soft and nontender   ; voids freely  ext: no edema    labs                       11.0   8.17  )-----------( 122      ( 2018 09:37 )             33.1   Comprehensive Metabolic Panel in AM (18 @ 07:23)    Sodium, Serum: 139 mmol/L    Potassium, Serum: 3.7 mmol/L    Chloride, Serum: 101 mmol/L    Carbon Dioxide, Serum: 25 mmol/L    Anion Gap, Serum: 13 mmol/L    Blood Urea Nitrogen, Serum: 11 mg/dL    Creatinine, Serum: 0.89 mg/dL    Glucose, Serum: 101 mg/dL    Calcium, Total Serum: 9.4 mg/dL    Protein Total, Serum: 6.8 g/dL    Albumin, Serum: 4.7 g/dL    Bilirubin Total, Serum: 0.4 mg/dL    Alkaline Phosphatase, Serum: 87 U/L    Aspartate Aminotransferase (AST/SGOT): 21 U/L    Alanine Aminotransferase (ALT/SGPT): 43 U/L   A/p 57 yrs old female with breast ca s/p chemo ( carboplatin and tox acetal) now feeling dizzy   pt orthostatic , standing 80/50 hr 94 and lying 104/70 88  plan normal saline 500ml bolus and maintenance fluids at 50 cc/hr  strict intake output  monitor labs

## 2018-07-05 NOTE — PROGRESS NOTE ADULT - ASSESSMENT
. 7/5 hematuria persists but better and the diarrhea is still present and is lessening in number the c diff was negative.  Hem/Onc History

## 2018-07-05 NOTE — PROGRESS NOTE ADULT - SUBJECTIVE AND OBJECTIVE BOX
[facility] Hematology/Oncology - Kecia Dhaliwal / Preston / Shawn - [phone]  Primary Outpatient Hematologist/Oncologist: Dr. Nicola Steen    Chief Complaint:  Hematuria    HPI:  Patient is a 57 year old woman with history of breast cancer who is undergoing neoadjuvant chemotherapy who presents with hematuria. Reports hematuria started on Friday and also reports having profound diarrhea. Urine was intially dark red but it has not turned pink. She also reports abdominal pain and severe back pain intermittently for the last two days. No other complaints 7/2 case discussed with Dr Escobar and pt completed chemotherapy 5 days ago and I will have to find out the type of chemo given. Hematuria painless in nature continues and kidney stone may be the cause and urology is on the case.  7/3 I called Dr Rivera office and the pt received chemotherapy with TCHP and this was given on 6/27 and this was 6 days ago. She did get Neulasta so I do not think the counts will be affected that much here. 7/5 hematuria persists but better and the diarrhea is still present and is lessening in number the c diff was negative.  Hem/Onc History  Stage II breast cancer. Left breast mass. 4.2cm mass with multiple smaller surrounding masses. Dx 2/2018. ER 90%, [place] 0%, Olv5riu 2/3+ with amplification on FISH. On neoadjuvant chemotherapy with Carboplatin, taxol, herceptin, and perjecta every 3 weeks for 6 doses prior to surgery. s/p 5 of 6 doses with last dose given on 6/27/2018 with onPro Neulasta injection.     PAST MEDICAL & SURGICAL HISTORY:  Murmur, heart  Asthma  Breast cancer  H/O abdominal hysterectomy    Social History  Tobacco: Denies current use  Alcohol: Denies current use  Drugs:  Denies current use    FAMILY HISTORY:  Family history of CHF (congestive heart failure) (Mother)  MEDICATIONS  (STANDING):  sodium chloride 0.9% Bolus 500 milliLiter(s) IV Bolus once  sodium chloride 0.9%. 1000 milliLiter(s) (75 mL/Hr) IV Continuous <Continuous>    MEDICATIONS  (PRN):  acetaminophen   Tablet. 650 milliGRAM(s) Oral every 6 hours PRN Mild Pain (1 - 3)  loperamide 2 milliGRAM(s) Oral four times a day PRN Diarrhea    Physical Exam:pt was off the floor at the time of my visit.  General: AAO x 3. NAD. Lying in bed comfortably.  HEENT: clear oropharynx, anicteric sclera, pink conjunctivae, EOMi. PERRLA  Cardiac: S1, S2 present. No audible murmurs. RRR  Lungs: CTA B/L.   Abdomen: Soft, Non-Tender, Non-Distended. No palpable hepatosplenomegaly  Extremities: 2+ pulses. No edema  Skin: No Rashes. No petechiae  Neuro: No focal motor or sensory deficits.                         11.0   8.17  )-----------( 122      ( 05 Jul 2018 09:37 )             33.1       07-01    142  |  103  |  16  ----------------------------<  119<H>  3.5   |  25  |  0.95    Ca    8.2<L>      01 Jul 2018 09:25  Phos  2.1     07-01  Mg     1.3     07-01    TPro  7.0  /  Alb  4.4  /  TBili  0.7  /  DBili  x   /  AST  34  /  ALT  37  /  AlkPhos  101  06-30    PT/INR - ( 30 Jun 2018 20:31 )   PT: 12.7 sec;   INR: 1.16 ratio    PTT - ( 30 Jun 2018 20:31 )  PTT:26.1 sec    Urinalysis + Microscopic Examination (06.30.18 @ 20:31)    Color: Red    Urobilinogen: Negative    Specific Gravity: 1.017    Protein, Urine: 300 mg/dL    Glucose Qualitative, Urine: Trace    Blood, Urine: Large    Urine Appearance: Turbid    Bilirubin: Negative    pH Urine: 6.5    Leukocyte Esterase Concentration: Moderate    Nitrite: Negative    Ketone - Urine: Trace    Red Blood Cell - Urine: >50 /HPF    White Blood Cell - Urine: 26-50 /HPF    Bacteria: Moderate /HPF

## 2018-07-06 LAB
ANION GAP SERPL CALC-SCNC: 14 MMOL/L — SIGNIFICANT CHANGE UP (ref 5–17)
BUN SERPL-MCNC: 11 MG/DL — SIGNIFICANT CHANGE UP (ref 7–23)
CALCIUM SERPL-MCNC: 9 MG/DL — SIGNIFICANT CHANGE UP (ref 8.4–10.5)
CHLORIDE SERPL-SCNC: 103 MMOL/L — SIGNIFICANT CHANGE UP (ref 96–108)
CO2 SERPL-SCNC: 26 MMOL/L — SIGNIFICANT CHANGE UP (ref 22–31)
CREAT SERPL-MCNC: 1 MG/DL — SIGNIFICANT CHANGE UP (ref 0.5–1.3)
CULTURE RESULTS: SIGNIFICANT CHANGE UP
GLUCOSE SERPL-MCNC: 102 MG/DL — HIGH (ref 70–99)
HCT VFR BLD CALC: 30.3 % — LOW (ref 34.5–45)
HGB BLD-MCNC: 9.8 G/DL — LOW (ref 11.5–15.5)
MAGNESIUM SERPL-MCNC: 1.4 MG/DL — LOW (ref 1.6–2.6)
MCHC RBC-ENTMCNC: 31.9 PG — SIGNIFICANT CHANGE UP (ref 27–34)
MCHC RBC-ENTMCNC: 32.3 GM/DL — SIGNIFICANT CHANGE UP (ref 32–36)
MCV RBC AUTO: 98.7 FL — SIGNIFICANT CHANGE UP (ref 80–100)
PLATELET # BLD AUTO: 124 K/UL — LOW (ref 150–400)
POTASSIUM SERPL-MCNC: 4 MMOL/L — SIGNIFICANT CHANGE UP (ref 3.5–5.3)
POTASSIUM SERPL-SCNC: 4 MMOL/L — SIGNIFICANT CHANGE UP (ref 3.5–5.3)
RBC # BLD: 3.07 M/UL — LOW (ref 3.8–5.2)
RBC # FLD: 14.8 % — HIGH (ref 10.3–14.5)
SODIUM SERPL-SCNC: 143 MMOL/L — SIGNIFICANT CHANGE UP (ref 135–145)
SPECIMEN SOURCE: SIGNIFICANT CHANGE UP
WBC # BLD: 14.14 K/UL — HIGH (ref 3.8–10.5)
WBC # FLD AUTO: 14.14 K/UL — HIGH (ref 3.8–10.5)

## 2018-07-06 RX ORDER — SODIUM CHLORIDE 9 MG/ML
1000 INJECTION, SOLUTION INTRAVENOUS
Qty: 0 | Refills: 0 | Status: DISCONTINUED | OUTPATIENT
Start: 2018-07-06 | End: 2018-07-11

## 2018-07-06 RX ORDER — MAGNESIUM SULFATE 500 MG/ML
1 VIAL (ML) INJECTION ONCE
Qty: 0 | Refills: 0 | Status: COMPLETED | OUTPATIENT
Start: 2018-07-06 | End: 2018-07-06

## 2018-07-06 RX ADMIN — SODIUM CHLORIDE 100 MILLILITER(S): 9 INJECTION, SOLUTION INTRAVENOUS at 13:59

## 2018-07-06 RX ADMIN — Medication 100 GRAM(S): at 11:35

## 2018-07-06 NOTE — PROGRESS NOTE ADULT - SUBJECTIVE AND OBJECTIVE BOX
Patient is a 57y old  Female who presents with a chief complaint of hematuria (30 Jun 2018 23:13)      INTERVAL HPI/OVERNIGHT EVENTS: noted, loose bm this am mixed with blood  feels well this am compared to yesterday      Vital Signs Last 24 Hrs  T(C): 36.8 (06 Jul 2018 09:30), Max: 36.8 (05 Jul 2018 15:36)  T(F): 98.2 (06 Jul 2018 09:30), Max: 98.2 (05 Jul 2018 15:36)  HR: 99 (06 Jul 2018 09:30) (90 - 99)  BP: 103/65 (06 Jul 2018 09:30) (103/65 - 107/72)  BP(mean): --  RR: 16 (06 Jul 2018 09:30) (16 - 18)  SpO2: 99% (06 Jul 2018 09:30) (99% - 99%)    acetaminophen   Tablet. 650 milliGRAM(s) Oral every 6 hours PRN  loperamide 2 milliGRAM(s) Oral four times a day PRN  sodium chloride 0.9% 1000 milliLiter(s) IV Continuous <Continuous>      PHYSICAL EXAM:  GENERAL: NAD,   EYES: conjunctiva and sclera clear  ENMT: Moist mucous membranes  NECK: Supple, No JVD, Normal thyroid  NERVOUS SYSTEM:  Alert & Oriented X3,   CHEST/LUNG: Clear to auscultation bilaterally; No rales, rhonchi, wheezing, or rubs  HEART: Regular rate and rhythm; No murmurs, rubs, or gallops  ABDOMEN: Soft, Nontender, Nondistended; Bowel sounds present  EXTREMITIES:  2+ Peripheral Pulses, No clubbing, cyanosis, or edema  LYMPH: No lymphadenopathy noted  SKIN: No rashes or lesions    Consultant(s) Notes Reviewed:  [x ] YES  [ ] NO  Care Discussed with Consultants/Other Providers [ x] YES  [ ] NO    LABS:                        9.8    14.14 )-----------( 124      ( 06 Jul 2018 09:49 )             30.3     07-06    143  |  103  |  11  ----------------------------<  102<H>  4.0   |  26  |  1.00    Ca    9.0      06 Jul 2018 10:46  Mg     1.4     07-06          CAPILLARY BLOOD GLUCOSE                RADIOLOGY & ADDITIONAL TESTS:    Imaging Personally Reviewed:  [x ] YES  [ ] NO

## 2018-07-06 NOTE — PROGRESS NOTE ADULT - SUBJECTIVE AND OBJECTIVE BOX
[facility] Hematology/Oncology - Kecia Dhaliwal / Preston / Shawn - [phone]  Primary Outpatient Hematologist/Oncologist: Dr. Nicola Steen    Chief Complaint:  Hematuria    HPI:  Patient is a 57 year old woman with history of breast cancer who is undergoing neoadjuvant chemotherapy who presents with hematuria. Reports hematuria started on Friday and also reports having profound diarrhea. Urine was intially dark red but it has not turned pink. She also reports abdominal pain and severe back pain intermittently for the last two days. No other complaints 7/2 case discussed with Dr Escobar and pt completed chemotherapy 5 days ago and I will have to find out the type of chemo given. Hematuria painless in nature continues and kidney stone may be the cause and urology is on the case.  7/3 I called Dr Rivera office and the pt received chemotherapy with TCHP and this was given on 6/27 and this was 6 days ago. She did get Neulasta so I do not think the counts will be affected that much here. 7/5 hematuria persists but better and the diarrhea is still present and is lessening in number the c diff was negative.  Hem/Onc History  Stage II breast cancer. Left breast mass. 4.2cm mass with multiple smaller surrounding masses. Dx 2/2018. ER 90%, [place] 0%, Per2tgb 2/3+ with amplification on FISH. On neoadjuvant chemotherapy with Carboplatin, taxol, herceptin, and perjecta every 3 weeks for 6 doses prior to surgery. s/p 5 of 6 doses with last dose given on 6/27/2018 with onPro Neulasta injection. 7/6 diarrhea better but bloody and pt may need chemo doses decreased. Still having difficulty eating.    PAST MEDICAL & SURGICAL HISTORY:  Murmur, heart  Asthma  Breast cancer  H/O abdominal hysterectomy    Social History  Tobacco: Denies current use  Alcohol: Denies current use  Drugs:  Denies current use    FAMILY HISTORY:  Family history of CHF (congestive heart failure) (Mother)  MEDICATIONS  (STANDING):  sodium chloride 0.9% Bolus 500 milliLiter(s) IV Bolus once  sodium chloride 0.9%. 1000 milliLiter(s) (75 mL/Hr) IV Continuous <Continuous>    MEDICATIONS  (PRN):  acetaminophen   Tablet. 650 milliGRAM(s) Oral every 6 hours PRN Mild Pain (1 - 3)  loperamide 2 milliGRAM(s) Oral four times a day PRN Diarrhea    Physical Exam:pt was off the floor at the time of my visit.  General: AAO x 3. NAD. Lying in bed comfortably.  HEENT: clear oropharynx, anicteric sclera, pink conjunctivae, EOMi. PERRLA  Cardiac: S1, S2 present. No audible murmurs. RRR  Lungs: CTA B/L.   Abdomen: Soft, Non-Tender, Non-Distended. No palpable hepatosplenomegaly  Extremities: 2+ pulses. No edema  Skin: No Rashes. No petechiae  Neuro: No focal motor or sensory deficits.                                     9.8    14.14 )-----------( 124      ( 06 Jul 2018 09:49 )             30.3       07-01    142  |  103  |  16  ----------------------------<  119<H>  3.5   |  25  |  0.95    Ca    8.2<L>      01 Jul 2018 09:25  Phos  2.1     07-01  Mg     1.3     07-01    TPro  7.0  /  Alb  4.4  /  TBili  0.7  /  DBili  x   /  AST  34  /  ALT  37  /  AlkPhos  101  06-30    PT/INR - ( 30 Jun 2018 20:31 )   PT: 12.7 sec;   INR: 1.16 ratio    PTT - ( 30 Jun 2018 20:31 )  PTT:26.1 sec    Urinalysis + Microscopic Examination (06.30.18 @ 20:31)    Color: Red    Urobilinogen: Negative    Specific Gravity: 1.017    Protein, Urine: 300 mg/dL    Glucose Qualitative, Urine: Trace    Blood, Urine: Large    Urine Appearance: Turbid    Bilirubin: Negative    pH Urine: 6.5    Leukocyte Esterase Concentration: Moderate    Nitrite: Negative    Ketone - Urine: Trace    Red Blood Cell - Urine: >50 /HPF    White Blood Cell - Urine: 26-50 /HPF    Bacteria: Moderate /HPF

## 2018-07-06 NOTE — PROGRESS NOTE ADULT - PROBLEM SELECTOR PLAN 4
likely 2nd chemo  c diff neg, cxneg for enteric pathogens, o&p pending, pcr pending  hypomag, hypophos, hypocal, hypokalemia likely 2/2 diarrhea  d/w ID- immodium started as infection ruled out  monitor BM, lytes repletion  will increase ivf with k, check electrolytes k and mg likely 2nd chemo  c diff neg, cxneg for enteric pathogens, o&p pending, pcr pending  hypomag, hypophos, hypocal, hypokalemia likely 2/2 diarrhea  d/w ID- immodium started as infection ruled out  monitor BM, lytes repletion  will increase ivf with k, check electrolytes k and mg    blood in stool: GI c/s appreciated, likely inflammatory colitis post chemo-monitor  less likely infectious

## 2018-07-06 NOTE — CONSULT NOTE ADULT - SUBJECTIVE AND OBJECTIVE BOX
CC: blood diarrhea  HPI:  57 f with breast cancer on docetaxel and carboplatin last dose Wednesday admitted for hematuria - initially dark red yesterday followed by an episode of profuse diarrhea then  pinkish colored urine all day today.  She reports since her 4th of chemotherapy she has been having worsening diarrhea. last 6 days after her 4th round. then resolve but immediately started after her last round of chemotherapy. she reports 5-6 bowel movements daily which are loose. today she feels her bowel movements are start to solidify although are stool loose. When she submitted a stool sample she noted some red blood in the stool but is otherwise brown stool.  Currently she denies any abdominal pain but does notice some abdominal bloating. her appetite is down from the cehmotherpay but is not nauseated and is not vomiting. she had briones today without issue. she denies rectal pain or rectal pressure or rectal itching.   Overall pt states she has been feeling very weak and fatigued since chemo session 3 weeks ago. never had a colonoscopy before    stool studies obtained which are negative. c diff negative    In ED received keflex 500mg po and then ctx 1 g with NS 2L. (30 Jun 2018 23:13)      PAST MEDICAL & SURGICAL HISTORY:  Murmur, heart  Asthma  Breast cancer  H/O abdominal hysterectomy      MEDICATIONS  (STANDING):  sodium chloride 0.9% 1000 milliLiter(s) (100 mL/Hr) IV Continuous <Continuous>    MEDICATIONS  (PRN):  acetaminophen   Tablet. 650 milliGRAM(s) Oral every 6 hours PRN Mild Pain (1 - 3)  loperamide 2 milliGRAM(s) Oral four times a day PRN Diarrhea      Allergies    No Known Allergies    Intolerances        Review of Systems:    General:  No wt loss, fevers, chills, night sweats,fatigue,   CV:  No pain, palpitatioins, hypo/hypertension  Resp:  No dyspnea, cough, tachypnea, wheezing  GI:  No pain, No nausea, No vomiting, + diarrhea, No constipatiion, No weight loss, No fever, No pruritis, + rectal bleeding, No tarry stools, No dysphagia,  :  No pain, bleeding, incontinence, nocturia  Muscle:  No pain, weakness  Neuro:  No weakness, tingling, memory problems  Psych:  No fatigue, insomnia, mood problems, depression  Endocrine:  No polyuria, polydypsia, cold/heat intolerance  Heme:  No petechiae, ecchymosis, easy bruisability  Skin:  No rash, tattoos, scars, edema      Vital Signs Last 24 Hrs  T(C): 36.8 (06 Jul 2018 09:30), Max: 36.8 (05 Jul 2018 15:36)  T(F): 98.2 (06 Jul 2018 09:30), Max: 98.2 (05 Jul 2018 15:36)  HR: 99 (06 Jul 2018 09:30) (90 - 99)  BP: 103/65 (06 Jul 2018 09:30) (103/65 - 107/72)  BP(mean): --  RR: 16 (06 Jul 2018 09:30) (16 - 18)  SpO2: 99% (06 Jul 2018 09:30) (99% - 99%)    PHYSICAL EXAM:    Constitutional: NAD, well-developed  Neck: No LAD, supple  Respiratory: CTA and P  Cardiovascular: S1 and S2, RRR, no M  Gastrointestinal: BS+, soft, NT/ND, neg HSM,  Extremities: No peripheral edema, neg clubing, cyanosis  Vascular: 2+ peripheral pulses  Neurological: A/O x 3, no focal deficits  Psychiatric: Normal mood, normal affect  Skin: No rashes        LABS:                        9.8    14.14 )-----------( 124      ( 06 Jul 2018 09:49 )             30.3     07-06    143  |  103  |  11  ----------------------------<  102<H>  4.0   |  26  |  1.00    Ca    9.0      06 Jul 2018 10:46  Mg     1.4     07-06      Hemoglobin: 9.8 g/dL (07-06-18 @ 09:49)  Hemoglobin: 11.0 g/dL (07-05-18 @ 09:37)  Hemoglobin: 10.7 g/dL (07-04-18 @ 10:35)  Hemoglobin: 10.2 g/dL (07-03-18 @ 08:12)  Hemoglobin: 9.6 g/dL (07-02-18 @ 09:29)      LIVER FUNCTIONS - ( 04 Jul 2018 07:23 )  Alb: 4.7 g/dL / Pro: 6.8 g/dL / ALK PHOS: 87 U/L / ALT: 43 U/L / AST: 21 U/L / GGT: x             RADIOLOGY & ADDITIONAL TESTS:    EXAM:  CT ABDOMEN AND PELVIS IC                            PROCEDURE DATE:  07/03/2018            INTERPRETATION:  CLINICAL INFORMATION: Hematuria and diarrhea, history of   kidney stones. Patient completed neoadjuvant chemotherapy 5 days ago for   breast cancer. Previous CT showed mild hydronephrosis    COMPARISON: CT abdomen and pelvis 07/02/2018.    PROCEDURE:   CT of the Abdomen and Pelvis was performed with and without intravenous   contrast.   Precontrast, Nephrographic and Excretory phases were acquired.  Intravenous contrast: 90 ml Omnipaque 350. 10 ml discarded.  Oral contrast: None.  Sagittal and coronal reformats were performed.    FINDINGS:    LOWER CHEST: Redemonstration of asymmetric soft tissue density in the   lateral left breast, which may correspond to patient's known breast   cancer.    ABDOMEN:    LIVER: Within normal limits.  BILE DUCTS: Normal caliber.  GALLBLADDER: Cholelithiasis.  SPLEEN: Within normal limits.  PANCREAS: Within normal limits.  ADRENALS: Mild bilateral nodular thickening.    KIDNEYS/URETERS:   Right: 1.7 x 0.7 x 0.8 cm irregular filling defect extending from the   right upper pole calyx into the collecting system (series 7, image 27   series 10, image 42). The most distal aspect is linear (series 7, image   28). Filling defect/mass is not discretely apparent on noncontrast exam,   limiting evaluation for enhancement. As on prior study, there is mild   right renal pelvic fullness with abrupt narrowing at the UPJ, which may   be on the basisof UPJ obstruction. Stable nonobstructing subcentimeter   stones in the right kidney measuring up to 0.4 cm at the lower pole   (series 6, image 41).  Left: Normal left kidney without stones or hydronephrosis.       BLADDER: Normal.  REPRODUCTIVE ORGANS: Status post hysterectomy. No solid adnexal masses.    VISUALIZED PORTIONS:  BOWEL: Underdistention with mild to moderate thickening of the cecum and   ascending colon.  PERITONEUM: No ascites.  VESSELS: Atherosclerotic change of the abdominal aorta.  RETROPERITONEUM: No lymphadenopathy.    ABDOMINAL WALL: Small fat-containing umbilical hernia.  BONES: Scattered subcentimeter sclerotic foci throughout the pelvic bones   measuring up to 0.6 cm in the left iliac bone (11, 207).    IMPRESSION:  1.  1.7 cm irregular filling defect in the right upper pole calyx   extending into the right renal collecting system without definite   enhancement. In a patient with renal stones and gross hematuria,   differential includes hemorrhagic debris versus malignancy. Correlation   with urine cytology is recommended.  2.  Stable nonobstructing subcentimeter stones in the right kidney   measuring up to 0.4 cm at the lower pole.  3.  Mild to moderate thickening of the cecum and ascending colon. Given   history of recent chemotherapy, findings are concerning for typhlitis.  4.  Cholelithiasis.  5.  Redemonstration of asymmetric soft tissue density in the left breast,   may correspond to patient's known breast cancer.    The findings were discussed by Dr. Blue with GERA Zafar at 1600 hours   on 07/03/2018.                        AUTUMN BLUE M.D., RADIOLOGY RESIDENT  This document has been electronically signed.  HILARIO SHEPHERD M.D., ATTENDING RADIOLOGIST  This document has been electronically signed. Jul  3 2018  4:33PM

## 2018-07-06 NOTE — PROGRESS NOTE ADULT - ASSESSMENT
. 7/6 diarrhea better but bloody and pt may need chemo doses decreased. Still having difficulty eating.

## 2018-07-06 NOTE — CONSULT NOTE ADULT - ASSESSMENT
57 year old woman with breast cancer undergoing chemotherapy with 9 days of diarhea with some blood noted in the stool.   rectal deferred.  hgb stable.   right sided colitis noted. possibly chemo related, infectious. not consistent with typhlitis given normal WBC  overall patient feels she is improving.   currently on antibiotics  wbc increased today. would trend daily to ensure does not increase further  agree with prn immodium given overall improvement but if does not continue to improve tomorrow would change to standing immodium one pill three times a day  if persists on immodium recommend colonoscopy next week.   will continue to follow  call with questions  [phone]

## 2018-07-06 NOTE — PROGRESS NOTE ADULT - ATTENDING COMMENTS
I'm available for issues over the weekend, please call if ID input needed.    Ajay Trejo MD  [phone]

## 2018-07-06 NOTE — PROGRESS NOTE ADULT - PROBLEM SELECTOR PLAN 4
suspect inflammatory post chemo and started imodium with neg infectious workup. Did order feces GI pcr but noted negative stool cultures to date and patient reporting this is her common chemo side-effect.

## 2018-07-06 NOTE — PROGRESS NOTE ADULT - ASSESSMENT
57 f with breast cancer on docetaxel and carboplatin last dose Wednesday p/w hematuria, leukocytosis and noted loose stools but neg ID workup

## 2018-07-06 NOTE — PROGRESS NOTE ADULT - SUBJECTIVE AND OBJECTIVE BOX
[facility], Division of Infectious Diseases  ALFREDO Strong A. Lee  [phone]    Name: MAC HAMILTON  Age: 57y  Gender: Female  MRN: [id number]    Interval History--  Notes reviewed. Patient worried about blood in bedpan but not sure if it is from her urine or diarrhea. Stool more formed. No pain. Feels generally weak. No fevers, chills, or rigors.     Past Medical History--  Murmur, heart  Asthma  Breast cancer  H/O abdominal hysterectomy      For details regarding the patient's social history, family history, and other miscellaneous elements, please refer the initial infectious diseases consultation and/or the admitting history and physical examination for this admission.    Allergies    No Known Allergies    Intolerances        Medications--  Antibiotics:    Immunologic:    Other:  acetaminophen   Tablet. PRN  loperamide PRN  sodium chloride 0.9%.      Review of Systems--  A 10-point review of systems was obtained.   Review of systems otherwise negative except as previously noted.    Physical Examination--  Vital Signs: T(F): 98 (07-05-18 @ 22:09), Max: 98.2 (07-05-18 @ 15:36)  HR: 95 (07-05-18 @ 22:09)  BP: 107/72 (07-05-18 @ 22:09)  RR: 18 (07-05-18 @ 22:09)  SpO2: 99% (07-05-18 @ 22:09)  Wt(kg): --  General: Nontoxic-appearing Female in no acute distress.  HEENT: Alopecia. Anicteric. Conjunctiva pink and moist. Oropharynx clear.  Neck: Not rigid. No sense of mass.  Nodes: None palpable.  Lungs: Clear bilaterally without rales, wheezing or rhonchi  Heart: Regular rate and rhythm. No Murmur. No rub. No gallop. No palpable thrill.  Abdomen: Bowel sounds present and normoactive. Soft. Nondistended. Nontender. No sense of mass. No organomegaly.  Extremities: No cyanosis or clubbing. No edema.   Skin: Warm. Dry. Good turgor. No rash. No vasculitic stigmata.  Psychiatric: Appropriate affect and mood for situation.       Laboratory Studies--  CBC                        11.0   8.17  )-----------( 122      ( 05 Jul 2018 09:37 )             33.1       Chemistries          Culture Data    Culture - Stool (collected 01 Jul 2018 16:38)  Source: .Stool Feces  Final Report (03 Jul 2018 17:01):    No enteric pathogens isolated.    (Stool culture examined for Salmonella,    Shigella, Campylobacter, Aeromonas, Plesiomonas,    Vibrio, E.coli O157 and Yersinia)    Culture - Urine (collected 01 Jul 2018 00:18)  Source: .Urine Clean Catch (Midstream)  Final Report (01 Jul 2018 21:59):    No growth

## 2018-07-07 LAB
ANION GAP SERPL CALC-SCNC: 10 MMOL/L — SIGNIFICANT CHANGE UP (ref 5–17)
BUN SERPL-MCNC: 7 MG/DL — SIGNIFICANT CHANGE UP (ref 7–23)
CALCIUM SERPL-MCNC: 8.9 MG/DL — SIGNIFICANT CHANGE UP (ref 8.4–10.5)
CHLORIDE SERPL-SCNC: 107 MMOL/L — SIGNIFICANT CHANGE UP (ref 96–108)
CO2 SERPL-SCNC: 25 MMOL/L — SIGNIFICANT CHANGE UP (ref 22–31)
CREAT SERPL-MCNC: 0.93 MG/DL — SIGNIFICANT CHANGE UP (ref 0.5–1.3)
CULTURE RESULTS: SIGNIFICANT CHANGE UP
GLUCOSE SERPL-MCNC: 99 MG/DL — SIGNIFICANT CHANGE UP (ref 70–99)
HCT VFR BLD CALC: 28.2 % — LOW (ref 34.5–45)
HGB BLD-MCNC: 9.4 G/DL — LOW (ref 11.5–15.5)
MAGNESIUM SERPL-MCNC: 1.6 MG/DL — SIGNIFICANT CHANGE UP (ref 1.6–2.6)
MCHC RBC-ENTMCNC: 33.2 PG — SIGNIFICANT CHANGE UP (ref 27–34)
MCHC RBC-ENTMCNC: 33.3 GM/DL — SIGNIFICANT CHANGE UP (ref 32–36)
MCV RBC AUTO: 99.6 FL — SIGNIFICANT CHANGE UP (ref 80–100)
PLATELET # BLD AUTO: 122 K/UL — LOW (ref 150–400)
POTASSIUM SERPL-MCNC: 4.4 MMOL/L — SIGNIFICANT CHANGE UP (ref 3.5–5.3)
POTASSIUM SERPL-SCNC: 4.4 MMOL/L — SIGNIFICANT CHANGE UP (ref 3.5–5.3)
RBC # BLD: 2.83 M/UL — LOW (ref 3.8–5.2)
RBC # FLD: 14.9 % — HIGH (ref 10.3–14.5)
SODIUM SERPL-SCNC: 142 MMOL/L — SIGNIFICANT CHANGE UP (ref 135–145)
SPECIMEN SOURCE: SIGNIFICANT CHANGE UP
WBC # BLD: 12.37 K/UL — HIGH (ref 3.8–10.5)
WBC # FLD AUTO: 12.37 K/UL — HIGH (ref 3.8–10.5)

## 2018-07-07 RX ORDER — LOPERAMIDE HCL 2 MG
2 TABLET ORAL
Qty: 0 | Refills: 0 | Status: DISCONTINUED | OUTPATIENT
Start: 2018-07-07 | End: 2018-07-11

## 2018-07-07 RX ADMIN — Medication 2 MILLIGRAM(S): at 08:14

## 2018-07-07 NOTE — PROGRESS NOTE ADULT - PROBLEM SELECTOR PLAN 4
likely 2nd chemo  c diff neg, cxneg for enteric pathogens, o&p pending, pcr pending  hypomag, hypophos, hypocal, hypokalemia likely 2/2 diarrhea  d/w ID- immodium started as infection ruled out  monitor BM, lytes repletion  cont ivf, immodium    blood in stool: GI c/s appreciated, likely inflammatory colitis post chemo-monitor  less likely infectious

## 2018-07-07 NOTE — PROGRESS NOTE ADULT - PROBLEM SELECTOR PLAN 1
right sided colitis on ct   stools neg / likely s/e chemo  po as tolerated   will need colon   moniter wbc / id follow up  cont imodium prn (pt has not taken)

## 2018-07-07 NOTE — PROGRESS NOTE ADULT - SUBJECTIVE AND OBJECTIVE BOX
INTERVAL HPI/OVERNIGHT EVENTS:  pt with decr po / light nasuea   decr bm 2 times a day / still loose / no brb   pt has not asked for imodium  no abd pain or vomitting.      MEDICATIONS  (STANDING):  sodium chloride 0.9% 1000 milliLiter(s) (100 mL/Hr) IV Continuous <Continuous>    MEDICATIONS  (PRN):  acetaminophen   Tablet. 650 milliGRAM(s) Oral every 6 hours PRN Mild Pain (1 - 3)  loperamide 2 milliGRAM(s) Oral four times a day PRN Diarrhea      Allergies    No Known Allergies    Intolerances        Review of Systems: see HPI      Vital Signs Last 24 Hrs  T(C): 36.4 (07 Jul 2018 05:52), Max: 36.9 (06 Jul 2018 22:01)  T(F): 97.6 (07 Jul 2018 05:52), Max: 98.4 (06 Jul 2018 22:01)  HR: 80 (07 Jul 2018 05:52) (80 - 104)  BP: 94/62 (07 Jul 2018 05:52) (94/62 - 123/66)  BP(mean): --  RR: 16 (07 Jul 2018 05:52) (16 - 18)  SpO2: 98% (07 Jul 2018 05:52) (98% - 100%)    PHYSICAL EXAM:    Constitutional: NAD, well-developed  Gastrointestinal: BS+, soft, NT/ND, neg HSM,  Psychiatric: Normal mood, normal affect      LABS:                        9.8    14.14 )-----------( 124      ( 06 Jul 2018 09:49 )             30.3     07-07    142  |  107  |  7   ----------------------------<  99  4.4   |  25  |  0.93    Ca    8.9      07 Jul 2018 07:07  Mg     1.6     07-07          LIVER FUNCTIONS - ( 04 Jul 2018 07:23 )  Alb: 4.7 g/dL / Pro: 6.8 g/dL / ALK PHOS: 87 U/L / ALT: 43 U/L / AST: 21 U/L / GGT: x             RADIOLOGY & ADDITIONAL TESTS:

## 2018-07-07 NOTE — PROGRESS NOTE ADULT - SUBJECTIVE AND OBJECTIVE BOX
Patient is a 57y old  Female who presents with a chief complaint of hematuria (30 Jun 2018 23:13)      INTERVAL HPI/OVERNIGHT EVENTS: 2 episodes of loose bm this am, denies any brbpr today, no n/v/abd pain      Vital Signs Last 24 Hrs  T(C): 36.8 (07 Jul 2018 08:55), Max: 36.9 (06 Jul 2018 22:01)  T(F): 98.2 (07 Jul 2018 08:55), Max: 98.4 (06 Jul 2018 22:01)  HR: 104 (07 Jul 2018 08:55) (80 - 104)  BP: 125/75 (07 Jul 2018 08:55) (94/62 - 125/75)  BP(mean): --  RR: 18 (07 Jul 2018 08:55) (16 - 18)  SpO2: 96% (07 Jul 2018 08:55) (96% - 100%)    acetaminophen   Tablet. 650 milliGRAM(s) Oral every 6 hours PRN  loperamide 2 milliGRAM(s) Oral four times a day PRN  sodium chloride 0.9% 1000 milliLiter(s) IV Continuous <Continuous>      PHYSICAL EXAM:  GENERAL: NAD,   EYES: conjunctiva and sclera clear  ENMT: Moist mucous membranes  NECK: Supple, No JVD, Normal thyroid  NERVOUS SYSTEM:  Alert & Oriented X3,   CHEST/LUNG: Clear to auscultation bilaterally; No rales, rhonchi, wheezing, or rubs  HEART: Regular rate and rhythm; No murmurs, rubs, or gallops  ABDOMEN: Soft, Nontender, Nondistended; Bowel sounds present  EXTREMITIES:  2+ Peripheral Pulses, No clubbing, cyanosis, or edema  LYMPH: No lymphadenopathy noted  SKIN: No rashes or lesions    Consultant(s) Notes Reviewed:  [x ] YES  [ ] NO  Care Discussed with Consultants/Other Providers [ x] YES  [ ] NO    LABS:                        9.4    12.37 )-----------( 122      ( 07 Jul 2018 08:33 )             28.2     07-07    142  |  107  |  7   ----------------------------<  99  4.4   |  25  |  0.93    Ca    8.9      07 Jul 2018 07:07  Mg     1.6     07-07          CAPILLARY BLOOD GLUCOSE                RADIOLOGY & ADDITIONAL TESTS:    Imaging Personally Reviewed:  [ ] YES  [ ] NO

## 2018-07-08 LAB
HCT VFR BLD CALC: 30 % — LOW (ref 34.5–45)
HGB BLD-MCNC: 9.9 G/DL — LOW (ref 11.5–15.5)
MCHC RBC-ENTMCNC: 32.8 PG — SIGNIFICANT CHANGE UP (ref 27–34)
MCHC RBC-ENTMCNC: 33 GM/DL — SIGNIFICANT CHANGE UP (ref 32–36)
MCV RBC AUTO: 99.3 FL — SIGNIFICANT CHANGE UP (ref 80–100)
PLATELET # BLD AUTO: 121 K/UL — LOW (ref 150–400)
RBC # BLD: 3.02 M/UL — LOW (ref 3.8–5.2)
RBC # FLD: 15.1 % — HIGH (ref 10.3–14.5)
WBC # BLD: 9.85 K/UL — SIGNIFICANT CHANGE UP (ref 3.8–10.5)
WBC # FLD AUTO: 9.85 K/UL — SIGNIFICANT CHANGE UP (ref 3.8–10.5)

## 2018-07-08 RX ADMIN — Medication 2 MILLIGRAM(S): at 12:33

## 2018-07-08 RX ADMIN — Medication 2 MILLIGRAM(S): at 07:02

## 2018-07-08 NOTE — PROGRESS NOTE ADULT - PROBLEM SELECTOR PLAN 4
likely 2nd chemo  c diff neg, cxneg for enteric pathogens, o&p pending, pcr pending  immodium started as infection ruled out  monitor BM, lytes repletion  cont ivf, immodium  f/u GI and ID recs

## 2018-07-08 NOTE — PROGRESS NOTE ADULT - SUBJECTIVE AND OBJECTIVE BOX
Patient is a 57y old  Female who presents with a chief complaint of hematuria (30 Jun 2018 23:13)      INTERVAL HPI/OVERNIGHT EVENTS:1loose bm this am, improved with immodium  tolerating po, dec appetite      Vital Signs Last 24 Hrs  T(C): 37 (08 Jul 2018 07:51), Max: 37 (08 Jul 2018 07:51)  T(F): 98.6 (08 Jul 2018 07:51), Max: 98.6 (08 Jul 2018 07:51)  HR: 97 (08 Jul 2018 07:51) (90 - 97)  BP: 109/72 (08 Jul 2018 07:51) (107/69 - 111/60)  BP(mean): --  RR: 18 (08 Jul 2018 07:51) (18 - 18)  SpO2: 99% (08 Jul 2018 07:51) (98% - 100%)    acetaminophen   Tablet. 650 milliGRAM(s) Oral every 6 hours PRN  loperamide Solution 2 milliGRAM(s) Oral four times a day PRN  sodium chloride 0.9% 1000 milliLiter(s) IV Continuous <Continuous>      PHYSICAL EXAM:  GENERAL: NAD,   EYES: conjunctiva and sclera clear  ENMT: Moist mucous membranes  NECK: Supple, No JVD, Normal thyroid  NERVOUS SYSTEM:  Alert & Oriented X3,   CHEST/LUNG: Clear to auscultation bilaterally; No rales, rhonchi, wheezing, or rubs  HEART: Regular rate and rhythm; No murmurs, rubs, or gallops  ABDOMEN: Soft, Nontender, Nondistended; Bowel sounds present  EXTREMITIES:  2+ Peripheral Pulses, No clubbing, cyanosis, or edema  LYMPH: No lymphadenopathy noted  SKIN: No rashes or lesions    Consultant(s) Notes Reviewed:  [x ] YES  [ ] NO  Care Discussed with Consultants/Other Providers [ x] YES  [ ] NO    LABS:                        9.9    9.85  )-----------( 121      ( 08 Jul 2018 08:17 )             30.0     07-07    142  |  107  |  7   ----------------------------<  99  4.4   |  25  |  0.93    Ca    8.9      07 Jul 2018 07:07  Mg     1.6     07-07          CAPILLARY BLOOD GLUCOSE                RADIOLOGY & ADDITIONAL TESTS:    Imaging Personally Reviewed:  [ x] YES  [ ] NO

## 2018-07-08 NOTE — PROGRESS NOTE ADULT - SUBJECTIVE AND OBJECTIVE BOX
[facility] Hematology/Oncology - Kecia Dhaliwal / [place] / Shawn - [phone]  Primary Outpatient Hematologist/Oncologist:     Subjective  Patient seen and examined. Sitting in the chair, had one loose BM this morning, no blood init and no hematuria today.    Admitting Complaint/History  HPI:  57 f with breast cancer on docetaxel and carboplatin last dose Wednesday p/w hematuria - initially dark red a day before admission followed by an episode of profuse diarrhea then  pinkish colored urine all day on day of admission.    Overall pt states she has been feeling very weak and fatigued since chemo session 3 weeks ago and has developed extreme sensitivity of abdomen to light touch (clothing, etc) though no actual pain.  No f/c.  Poor appetite with poor taste for food.  Gagging on medications after chemo (takes a brief course of decadron).  + watery diarrhea surrounding chemo a few times a day with waxing/waning course each day.  No n/v.  + abdominal bloating.  Pt denies dysuria, urinary frequency, urinary hesitancy and urinary urgency.  LMP at 43 yo.      Of note pt states that while chemo was running on Wednesday she felt a brief episode of chest pain.  Has never had cp before.  Was told likely 2nd to chemo as resolved immediately after chemo infused.  Pt mother  of CHF.  Pt had a TTE within the last 6 months.  no sob/cough.  Pt states she has once more chemo session left and planned for mastectomy in August.    In ED received keflex 500mg po and then ctx 1 g with NS 2L. (2018 23:13)       ROS:  General:  (-)Pain, (+)Decrease appeite, (-)Fevers, (-)Chills, (-)Weight Loss  Eyes: (-)Blurry Vision, (-)Double Vision, (-)Vision Loss  ENT: (-)Sinus Congestion, (-)Decreased Hearing, (-)Nosebleeds, (-)Sore Throat  Cardiac: (-)Chest Pain, (-)Palpitations, (-)Shortness of breathe on exertion  Respiratory: (-)Cough, (-)hemoptysis, (-)Shortness of breathe  GI: (-)Nausea, (-)Vomiting, (+)Diarrhea, (-)Constipation, (-)Melena, (-)BRBPR  : (+)Hematuria, (-)Dysuria, (-)Polyuia  MSK: (-)Back pain, (-)Joint pain, (-)Stiffness  Dermatology: (-)Rash, (-)Itching  Neurology:  (-)Numbness, (-)Tingling, (-)Difficulty Walking, (-)Tremors, (-)Weakness  Psych: (-)Anxiety, (-)Depression, (-)Memory Loss  Hematology:  (-)Easy Bruising, (-)Easy Bleeding, (-)Night Sweats.     Objective  Vital Signs Last 24 Hrs  T(C): 37 (2018 07:51), Max: 37 (2018 07:51)  T(F): 98.6 (2018 07:51), Max: 98.6 (2018 07:51)  HR: 97 (2018 07:51) (90 - 97)  BP: 109/72 (2018 07:51) (107/69 - 111/60)  BP(mean): --  RR: 18 (2018 07:51) (18 - 18)  SpO2: 99% (2018 07:51) (98% - 100%)    Physical Exam:  General: AAO x 3. NAD.   HEENT: clear oropharynx, anicteric sclera, pink conjunctivae, EOMI  Cardiac: S1, S2 present. No audible murmurs. RRR  Lungs: CTA B/L.   Abdomen: Soft, Non-Tender, Non-Distended. No palpable hepatosplenomegaly  Extremities: No edema  Skin: No Rashes. No petechiae  Neuro: No focal motor or sensory deficits.     Medications:  MEDICATIONS  (STANDING):  sodium chloride 0.9% 1000 milliLiter(s) (100 mL/Hr) IV Continuous <Continuous>    MEDICATIONS  (PRN):  acetaminophen   Tablet. 650 milliGRAM(s) Oral every 6 hours PRN Mild Pain (1 - 3)  loperamide Solution 2 milliGRAM(s) Oral four times a day PRN Diarrhea      Labs:                        9.9    9.85  )-----------( 121      ( 2018 08:17 )             30.0     07-07    142  |  107  |  7   ----------------------------<  99  4.4   |  25  |  0.93    Ca    8.9      2018 07:07  Mg     1.6     07-07

## 2018-07-08 NOTE — PROGRESS NOTE ADULT - SUBJECTIVE AND OBJECTIVE BOX
INTERVAL HPI/OVERNIGHT EVENTS:  pt still with loose bm this morning / no brb  took immodium X 1 yesterday   decr appetite with nausea johann some po     MEDICATIONS  (STANDING):  sodium chloride 0.9% 1000 milliLiter(s) (100 mL/Hr) IV Continuous <Continuous>    MEDICATIONS  (PRN):  acetaminophen   Tablet. 650 milliGRAM(s) Oral every 6 hours PRN Mild Pain (1 - 3)  loperamide Solution 2 milliGRAM(s) Oral four times a day PRN Diarrhea      Allergies    No Known Allergies    Intolerances        Review of Systems: see HPI      Vital Signs Last 24 Hrs  T(C): 37 (08 Jul 2018 07:51), Max: 37 (08 Jul 2018 07:51)  T(F): 98.6 (08 Jul 2018 07:51), Max: 98.6 (08 Jul 2018 07:51)  HR: 97 (08 Jul 2018 07:51) (90 - 104)  BP: 109/72 (08 Jul 2018 07:51) (107/69 - 125/75)  BP(mean): --  RR: 18 (08 Jul 2018 07:51) (18 - 18)  SpO2: 99% (08 Jul 2018 07:51) (96% - 100%)    PHYSICAL EXAM:    Constitutional: NAD, well-developed  Neck: No LAD, supple  Respiratory: CTA and P  Cardiovascular: S1 and S2, RRR,   Gastrointestinal: BS+, soft, NT/ND, neg HSM,  Extremities: No peripheral edema,   Neurological: A/O x 3, Grossly non-focal   Psychiatric: Normal mood, normal affect      LABS:                        9.4    12.37 )-----------( 122      ( 07 Jul 2018 08:33 )             28.2     07-07    142  |  107  |  7   ----------------------------<  99  4.4   |  25  |  0.93    Ca    8.9      07 Jul 2018 07:07  Mg     1.6     07-07          LIVER FUNCTIONS - ( 04 Jul 2018 07:23 )  Alb: 4.7 g/dL / Pro: 6.8 g/dL / ALK PHOS: 87 U/L / ALT: 43 U/L / AST: 21 U/L / GGT: x             RADIOLOGY & ADDITIONAL TESTS:

## 2018-07-09 ENCOUNTER — TRANSCRIPTION ENCOUNTER (OUTPATIENT)
Age: 58
End: 2018-07-09

## 2018-07-09 LAB
HCT VFR BLD CALC: 26.9 % — LOW (ref 34.5–45)
HGB BLD-MCNC: 9 G/DL — LOW (ref 11.5–15.5)
MCHC RBC-ENTMCNC: 33.3 PG — SIGNIFICANT CHANGE UP (ref 27–34)
MCHC RBC-ENTMCNC: 33.5 GM/DL — SIGNIFICANT CHANGE UP (ref 32–36)
MCV RBC AUTO: 99.6 FL — SIGNIFICANT CHANGE UP (ref 80–100)
PLATELET # BLD AUTO: 85 K/UL — LOW (ref 150–400)
RBC # BLD: 2.7 M/UL — LOW (ref 3.8–5.2)
RBC # FLD: 15.5 % — HIGH (ref 10.3–14.5)
WBC # BLD: 7.4 K/UL — SIGNIFICANT CHANGE UP (ref 3.8–10.5)
WBC # FLD AUTO: 7.4 K/UL — SIGNIFICANT CHANGE UP (ref 3.8–10.5)

## 2018-07-09 RX ORDER — NYSTATIN CREAM 100000 [USP'U]/G
1 CREAM TOPICAL
Qty: 1 | Refills: 0 | OUTPATIENT
Start: 2018-07-09 | End: 2018-07-18

## 2018-07-09 RX ORDER — LOPERAMIDE HCL 2 MG
10 TABLET ORAL
Qty: 1 | Refills: 0 | OUTPATIENT
Start: 2018-07-09

## 2018-07-09 RX ORDER — ACETAMINOPHEN 500 MG
2 TABLET ORAL
Qty: 0 | Refills: 0 | COMMUNITY
Start: 2018-07-09

## 2018-07-09 NOTE — DISCHARGE NOTE ADULT - CARE PLAN
Principal Discharge DX:	Acute cystitis with hematuria  Goal:	Stable  Assessment and plan of treatment:	Urine culture neg, No need for Abx treatment  CT abd without contrast showed right non obs renal stone and mild hydro  urology evaluated and no indication for inpt workup  Follow up as OP with Dr. Rubio (urologist) [phone]  Secondary Diagnosis:	Diarrhea, unspecified type  Assessment and plan of treatment:	continue Imodium and follow up with Serenity Curtis (gastroenterologist)  Secondary Diagnosis:	Malignant neoplasm of female breast, unspecified estrogen receptor status, unspecified laterality, unspecified site of breast  Assessment and plan of treatment:	Follow up with your Oncologist Dr. Steen Principal Discharge DX:	Acute cystitis with hematuria  Goal:	Stable  Assessment and plan of treatment:	Urine culture neg, No need for Abx treatment  CT abd without contrast showed right non obs renal stone and mild hydro  urology evaluated and no indication for inpt workup  Follow up as OP with Dr. Rubio (urologist) [phone]  Secondary Diagnosis:	Diarrhea, unspecified type  Assessment and plan of treatment:	continue Imodium and follow up with Serenity Curtis (gastroenterologist)  Secondary Diagnosis:	Malignant neoplasm of female breast, unspecified estrogen receptor status, unspecified laterality, unspecified site of breast  Assessment and plan of treatment:	Follow up with your Oncologist Dr. Steen  Secondary Diagnosis:	Rash  Assessment and plan of treatment:	Nystatin ointment to affected area  Follow up with your dermatologist

## 2018-07-09 NOTE — PROGRESS NOTE ADULT - PROBLEM SELECTOR PLAN 4
suspect inflammatory post chemo and started imodium with neg infectious workup. Did order feces GI pcr but noted negative stool cultures to date and patient reporting this is her common chemo side-effect.    Thank you for consulting us and involving us in the management of this most interesting and challenging case.     Please Call with any further questions

## 2018-07-09 NOTE — PROGRESS NOTE ADULT - SUBJECTIVE AND OBJECTIVE BOX
Patient is a 57y old  Female who presents with a chief complaint of hematuria (09 Jul 2018 11:49)      INTERVAL HPI/OVERNIGHT EVENTS: noted, feels well    Vital Signs Last 24 Hrs  T(C): 36.8 (09 Jul 2018 15:37), Max: 36.8 (09 Jul 2018 15:37)  T(F): 98.2 (09 Jul 2018 15:37), Max: 98.2 (09 Jul 2018 15:37)  HR: 79 (09 Jul 2018 15:37) (79 - 93)  BP: 102/68 (09 Jul 2018 15:37) (102/68 - 112/68)  BP(mean): --  RR: 18 (09 Jul 2018 15:37) (18 - 18)  SpO2: 100% (09 Jul 2018 15:37) (98% - 100%)    acetaminophen   Tablet. 650 milliGRAM(s) Oral every 6 hours PRN  loperamide Solution 2 milliGRAM(s) Oral four times a day PRN  sodium chloride 0.9% 1000 milliLiter(s) IV Continuous <Continuous>      PHYSICAL EXAM:  GENERAL: NAD,   EYES: conjunctiva and sclera clear  ENMT: Moist mucous membranes  NECK: Supple, No JVD, Normal thyroid  NERVOUS SYSTEM:  Alert & Oriented X3,   CHEST/LUNG: Clear to auscultation bilaterally; No rales, rhonchi, wheezing, or rubs  HEART: Regular rate and rhythm; No murmurs, rubs, or gallops  ABDOMEN: Soft, Nontender, Nondistended; Bowel sounds present  EXTREMITIES:  2+ Peripheral Pulses, No clubbing, cyanosis, or edema  LYMPH: No lymphadenopathy noted  SKIN: No rashes or lesions    Consultant(s) Notes Reviewed:  [x ] YES  [ ] NO  Care Discussed with Consultants/Other Providers [ x] YES  [ ] NO    LABS:                        9.0    7.40  )-----------( 85       ( 09 Jul 2018 09:24 )             26.9               CAPILLARY BLOOD GLUCOSE                RADIOLOGY & ADDITIONAL TESTS:    Imaging Personally Reviewed:  [ x] YES  [ ] NO

## 2018-07-09 NOTE — PROGRESS NOTE ADULT - SUBJECTIVE AND OBJECTIVE BOX
infectious diseases progress note:    MAC HAMILTON is a 57y y. o. Female patient    Patient reports: some loose stools, blood in urine resolved, will have colonsocopy tomorrow.    ROS:    EYES:  Negative  blurry vision or double vision  GASTROINTESTINAL:  Negative for nausea, vomiting  -otherwise negative except for subjective    Allergies    No Known Allergies    Intolerances        ANTIBIOTICS/RELEVANT:  antimicrobials    immunologic:    OTHER:  acetaminophen   Tablet. 650 milliGRAM(s) Oral every 6 hours PRN  loperamide Solution 2 milliGRAM(s) Oral four times a day PRN  sodium chloride 0.9% 1000 milliLiter(s) IV Continuous <Continuous>      Objective:  Vital Signs Last 24 Hrs  T(C): 36.3 (09 Jul 2018 07:45), Max: 36.7 (08 Jul 2018 21:56)  T(F): 97.3 (09 Jul 2018 07:45), Max: 98.1 (08 Jul 2018 21:56)  HR: 93 (09 Jul 2018 07:45) (83 - 93)  BP: 112/68 (09 Jul 2018 07:45) (102/66 - 112/68)  BP(mean): --  RR: 18 (09 Jul 2018 07:45) (18 - 18)  SpO2: 98% (09 Jul 2018 07:45) (98% - 99%)    T(C): 36.3 (07-09-18 @ 07:45), Max: 37 (07-08-18 @ 07:51)  T(C): 36.3 (07-09-18 @ 07:45), Max: 37 (07-08-18 @ 07:51)  T(C): 36.3 (07-09-18 @ 07:45), Max: 37 (07-08-18 @ 07:51)    PHYSICAL EXAM:  Constitutional: Well-developed, well nourished  Eyes: PERRLA, EOMI  Ear/Nose/Throat: oropharynx normal	  Neck: no JVD, no lymphadenopathy, supple  Respiratory: no accessory muscle use  Cardiovascular: RRR,   Gastrointestinal: soft, NT  Extremities: no clubbing, no cyanosis, edema absent      LABS:                        9.9    9.85  )-----------( 121      ( 08 Jul 2018 08:17 )             30.0       9.85 07-08 @ 08:17  12.37 07-07 @ 08:33  14.14 07-06 @ 09:49  8.17 07-05 @ 09:37  5.66 07-04 @ 10:35  4.12 07-03 @ 08:12              Creatinine, Serum: 0.93 mg/dL (07-07-18 @ 07:07)  Creatinine, Serum: 1.00 mg/dL (07-06-18 @ 10:46)  Creatinine, Serum: 0.89 mg/dL (07-04-18 @ 07:23)  Creatinine, Serum: 0.87 mg/dL (07-03-18 @ 07:21)  Creatinine, Serum: 0.89 mg/dL (07-02-18 @ 16:52)                MICROBIOLOGY:              RADIOLOGY & ADDITIONAL STUDIES:

## 2018-07-09 NOTE — DISCHARGE NOTE ADULT - ADDITIONAL INSTRUCTIONS
Follow up with Serenity Curtis (Gastroenterologist), Dr. Rubio (Urologist), Dr. Steen (Oncologist) and your PCP

## 2018-07-09 NOTE — PROGRESS NOTE ADULT - ASSESSMENT
7/9 the notes were read from the weekend and at this time I could not retreive my last notes for unknown reasons. The pt was clearly better and there was a marked decrease in the diarrhea over the weekend. She can eat and will have a colonoscopy in the am. I will discuss with Dr Steen about the next chemo session as it is likely most if not all the symptoms she had here are from the drugs for the breast cancer.

## 2018-07-09 NOTE — DISCHARGE NOTE ADULT - CARE PROVIDERS DIRECT ADDRESSES
,DirectAddress_Unknown,[email or web address],DirectAddress_Unknown

## 2018-07-09 NOTE — PROGRESS NOTE ADULT - ASSESSMENT
57 year old woman with breast cancer undergoing chemotherapy with diarrhea and R sided colitis on imaging.  Possibly related to chemotherapy  agree with prn immodium given overall improvement   No need for inpatient colonoscopy given overall improvement and resolution of symptoms. Recommend outpatient GI follow up. DC planning  will continue to follow  call with questions  [phone]

## 2018-07-09 NOTE — CHART NOTE - NSCHARTNOTEFT_GEN_A_CORE
Informed by the RN that Pt is c/o skin rash prior to her discharge. Spoke to Dr. Rordiguez- to Rx Nystatin and follow up with Dermatologist as OP. Pt in agreement with plan.

## 2018-07-09 NOTE — PROGRESS NOTE ADULT - SUBJECTIVE AND OBJECTIVE BOX
Feels well. No complaints. No diarrhea or abd pain    Vital Signs Last 24 Hrs  T(C): 36.8 (09 Jul 2018 15:37), Max: 36.8 (09 Jul 2018 15:37)  T(F): 98.2 (09 Jul 2018 15:37), Max: 98.2 (09 Jul 2018 15:37)  HR: 79 (09 Jul 2018 15:37) (79 - 93)  BP: 102/68 (09 Jul 2018 15:37) (102/68 - 112/68)  BP(mean): --  RR: 18 (09 Jul 2018 15:37) (18 - 18)  SpO2: 100% (09 Jul 2018 15:37) (98% - 100%)    PHYSICAL EXAM:    Constitutional: NAD, well-developed  Neck: No LAD, supple  Respiratory: CTA and P  Cardiovascular: S1 and S2, RRR, no M  Gastrointestinal: BS+, soft, NT/ND, neg HSM,  Extremities: No peripheral edema, neg clubing, cyanosis  Vascular: 2+ peripheral pulses  Neurological: A/O x 3, no focal deficits  Psychiatric: Normal mood, normal affect  Skin: No rashes    MEDICATIONS  (STANDING):  sodium chloride 0.9% 1000 milliLiter(s) (100 mL/Hr) IV Continuous <Continuous>    MEDICATIONS  (PRN):  acetaminophen   Tablet. 650 milliGRAM(s) Oral every 6 hours PRN Mild Pain (1 - 3)  loperamide Solution 2 milliGRAM(s) Oral four times a day PRN Diarrhea      Allergies    No Known Allergies    Intolerances          LABS:                        9.0    7.40  )-----------( 85       ( 09 Jul 2018 09:24 )             26.9                         9.9    9.85  )-----------( 121      ( 08 Jul 2018 08:17 )             30.0                         9.4    12.37 )-----------( 122      ( 07 Jul 2018 08:33 )             28.2                     RADIOLOGY & ADDITIONAL TESTS:

## 2018-07-09 NOTE — DISCHARGE NOTE ADULT - SECONDARY DIAGNOSIS.
Diarrhea, unspecified type Malignant neoplasm of female breast, unspecified estrogen receptor status, unspecified laterality, unspecified site of breast Rash

## 2018-07-09 NOTE — DISCHARGE NOTE ADULT - PLAN OF CARE
Stable Urine culture neg, No need for Abx treatment  CT abd without contrast showed right non obs renal stone and mild hydro  urology evaluated and no indication for inpt workup  Follow up as OP with Dr. Rubio (urologist) [phone] continue Imodium and follow up with Serenity Curtis (gastroenterologist) Follow up with your Oncologist Dr. Steen Nystatin ointment to affected area  Follow up with your dermatologist

## 2018-07-09 NOTE — DISCHARGE NOTE ADULT - HOSPITAL COURSE
57 f with breast cancer undergoing chemo a/w hematuria.     Problem/Plan - 1:  Urine culture neg, monitor off abx per Infectious Disease  CT abd without contrast showed right non obs renal stone and mild hydro  urology evaluated and no indication for inpt workup        Problem/Plan - 2:  Malignant neoplasm of female breast, unspecified estrogen receptor status, unspecified laterality, unspecified site of breast.   Follow up with your Oncologist Dr. Steen     Problem/Plan - 3:  Anemia due to other cause.  Plan: likely 2nd chemo  Monitor H&H as OP and follow up with Oncologist Dr. Steen     Problem/Plan  Diarrhea, unspecified type.    Likely 2nd chemo  c diff neg, Cx neg for enteric pathogens,  Continue with imodium and follow up with gastroenterologist (Serenity Curtis)

## 2018-07-09 NOTE — PROGRESS NOTE ADULT - SUBJECTIVE AND OBJECTIVE BOX
[facility] Hematology/Oncology - Kecia Dhaliwal / [place] / Shawn - [phone]  Primary Outpatient Hematologist/Oncologist:     Subjective  Patient seen and examined. Sitting in the chair, had one loose BM this morning, no blood init and no hematuria today.    Admitting Complaint/History  HPI:  57 f with breast cancer on docetaxel and carboplatin last dose Wednesday p/w hematuria - initially dark red a day before admission followed by an episode of profuse diarrhea then  pinkish colored urine all day on day of admission.    Overall pt states she has been feeling very weak and fatigued since chemo session 3 weeks ago and has developed extreme sensitivity of abdomen to light touch (clothing, etc) though no actual pain.  No f/c.  Poor appetite with poor taste for food.  Gagging on medications after chemo (takes a brief course of decadron).  + watery diarrhea surrounding chemo a few times a day with waxing/waning course each day.  No n/v.  + abdominal bloating.  Pt denies dysuria, urinary frequency, urinary hesitancy and urinary urgency.  LMP at 41 yo.      Of note pt states that while chemo was running on Wednesday she felt a brief episode of chest pain.  Has never had cp before.  Was told likely 2nd to chemo as resolved immediately after chemo infused.  Pt mother  of CHF.  Pt had a TTE within the last 6 months.  no sob/cough.  Pt states she has once more chemo session left and planned for mastectomy in August.    In ED received keflex 500mg po and then ctx 1 g with NS 2L. (2018 23:13)    the notes were read from the weekend and at this time I could not retreive my last notes for unknown reasons. The pt was clearly better and there was a marked decrease in the diarrhea over the weekend. She can eat and will have a colonoscopy in the am. I will discuss with Dr Steen about the next chemo session as it is likely most if not all the symptoms she had here are from the drugs for the breast cancer.    ROS:  General:  (-)Pain, (+)Decrease appeite, (-)Fevers, (-)Chills, (-)Weight Loss  Eyes: (-)Blurry Vision, (-)Double Vision, (-)Vision Loss  ENT: (-)Sinus Congestion, (-)Decreased Hearing, (-)Nosebleeds, (-)Sore Throat  Cardiac: (-)Chest Pain, (-)Palpitations, (-)Shortness of breathe on exertion  Respiratory: (-)Cough, (-)hemoptysis, (-)Shortness of breathe  GI: (-)Nausea, (-)Vomiting, (+)Diarrhea, (-)Constipation, (-)Melena, (-)BRBPR  : (+)Hematuria, (-)Dysuria, (-)Polyuia  MSK: (-)Back pain, (-)Joint pain, (-)Stiffness  Dermatology: (-)Rash, (-)Itching  Neurology:  (-)Numbness, (-)Tingling, (-)Difficulty Walking, (-)Tremors, (-)Weakness  Psych: (-)Anxiety, (-)Depression, (-)Memory Loss  Hematology:  (-)Easy Bruising, (-)Easy Bleeding, (-)Night Sweats.     Objective  Vital Signs Last 24 Hrs  T(C): 37 (2018 07:51), Max: 37 (2018 07:51)  T(F): 98.6 (2018 07:51), Max: 98.6 (2018 07:51)  HR: 97 (2018 07:51) (90 - 97)  BP: 109/72 (2018 07:51) (107/69 - 111/60)  BP(mean): --  RR: 18 (2018 07:51) (18 - 18)  SpO2: 99% (2018 07:51) (98% - 100%)    Physical Exam:  General: AAO x 3. NAD.   HEENT: clear oropharynx, anicteric sclera, pink conjunctivae, EOMI  Cardiac: S1, S2 present. No audible murmurs. RRR  Lungs: CTA B/L.   Abdomen: Soft, Non-Tender, Non-Distended. No palpable hepatosplenomegaly  Extremities: No edema  Skin: No Rashes. No petechiae  Neuro: No focal motor or sensory deficits.     Medications:  MEDICATIONS  (STANDING):  sodium chloride 0.9% 1000 milliLiter(s) (100 mL/Hr) IV Continuous <Continuous>    MEDICATIONS  (PRN):  acetaminophen   Tablet. 650 milliGRAM(s) Oral every 6 hours PRN Mild Pain (1 - 3)  loperamide Solution 2 milliGRAM(s) Oral four times a day PRN Diarrhea                          9.0    7.40  )-----------( 85       ( 2018 09:24 )             26.9   Basic Metabolic Panel in AM (.18 @ 07:07)    Sodium, Serum: 140 mmol/L    Potassium, Serum: 3.2 mmol/L    Chloride, Serum: 102 mmol/L    Carbon Dioxide, Serum: 27 mmol/L    Anion Gap, Serum: 11 mmol/L    Blood Urea Nitrogen, Serum: 12 mg/dL    Creatinine, Serum: 0.79 mg/dL    Glucose, Serum: 107 mg/dL    Calcium, Total Serum: 8.5 mg/dL   MEDICATIONS  (STANDING):  sodium chloride 0.9% 1000 milliLiter(s) (100 mL/Hr) IV Continuous <Continuous>    MEDICATIONS  (PRN):  acetaminophen   Tablet. 650 milliGRAM(s) Oral every 6 hours PRN Mild Pain (1 - 3)  loperamide Solution 2 milliGRAM(s) Oral four times a day PRN Diarrhea  Vital Signs Last 24 Hrs  T(C): 36.3 (2018 07:45), Max: 36.7 (2018 21:56)  T(F): 97.3 (2018 07:45), Max: 98.1 (2018 21:56)  HR: 93 (2018 07:45) (83 - 93)  BP: 112/68 (2018 07:45) (102/66 - 112/68)  BP(mean): --  RR: 18 (2018 07:45) (18 - 18)  SpO2: 98% (2018 07:45) (98% - 99%)    pe pt looked better and was sitting in the chair and was not in distress mouth clear heart rr abdomen soft extremities' no edema.

## 2018-07-09 NOTE — DISCHARGE NOTE ADULT - PATIENT PORTAL LINK FT
You can access the [facility] Patient Portal, offered by [facility], by registering with the following website: [email or web address]

## 2018-07-09 NOTE — DISCHARGE NOTE ADULT - CARE PROVIDER_API CALL
Nicola Steen), Internal Medicine; Medical Oncology  [address] [place] [address]  Phone: [phone]  Fax: [phone]    Janie Rubio), Urology  [address] [place] [address]  Phone: [phone]  Fax: [phone]    Serenity Alejandro), Internal Medicine  [address] [place] [address]  Phone: [phone]  Fax: [phone]

## 2018-07-09 NOTE — DISCHARGE NOTE ADULT - MEDICATION SUMMARY - MEDICATIONS TO TAKE
I will START or STAY ON the medications listed below when I get home from the hospital:    Shower chair  -- Indication: For Assited devise    acetaminophen 325 mg oral tablet  -- 2 tab(s) by mouth every 6 hours, As needed, Mild Pain (1 - 3)  -- Indication: For Analgesia    loperamide 1 mg/5 mL oral liquid  -- 10 milliliter(s) by mouth 4 times a day, As needed, Diarrhea  -- Indication: For Diarrhea    nystatin 100,000 units/g topical ointment  -- Apply on skin to affected area 2 times a day  -- For external use only.    -- Indication: For Skin rash

## 2018-07-10 RX ORDER — SODIUM CHLORIDE 9 MG/ML
1000 INJECTION INTRAMUSCULAR; INTRAVENOUS; SUBCUTANEOUS
Qty: 0 | Refills: 0 | Status: DISCONTINUED | OUTPATIENT
Start: 2018-07-10 | End: 2018-07-11

## 2018-07-10 RX ADMIN — SODIUM CHLORIDE 75 MILLILITER(S): 9 INJECTION INTRAMUSCULAR; INTRAVENOUS; SUBCUTANEOUS at 23:20

## 2018-07-10 NOTE — CHART NOTE - NSCHARTNOTEFT_GEN_A_CORE
Notified by RN; pt. is refusing to be discharged secondary to diarrhea. Pt. has been having diarrhea throughout her admission. GI has seen pt. and cleared her stating that diarrhea is likely secondary to chemotherapy. Pt. is on imodium but reports that she has had 5 episodes of diarrhea in the last few hours alone. Vitals currently stable. C.diff/stool cultures have been negative. Will hold discharge for now and monitor overnight. Revaluate discharge in AM.    -Sebastien Oconnor PA-C. #[id number]. Notified by RN; pt. is refusing to be discharged secondary to diarrhea. and feeling lightheaded Pt. has been having diarrhea throughout her admission. GI has seen pt. and cleared her stating that diarrhea is likely secondary to chemotherapy. Pt. is on imodium but reports that she has had 5 episodes of diarrhea in the last few hours alone. Vitals currently stable. C.diff/stool cultures have been negative. Will give NS at 75cc x 8 hours for now. Will hold discharge for now and monitor overnight. Revaluate discharge in AM.    -Sebastien Oconnor PA-C. #[id number].

## 2018-07-10 NOTE — PROGRESS NOTE ADULT - ATTENDING COMMENTS
innerthigh rash: likely dermatitis/fungal- nystatin, emollient  derm c/s  dc planning home sebastian Cespedes MD   [facility]

## 2018-07-10 NOTE — PROGRESS NOTE ADULT - PROBLEM SELECTOR PLAN 3
normalized    Thank you for consulting us and involving us in the management of this most interesting and challenging case.     Please Call with any further questions
likely 2nd chemo  monitor closely with hematuria  stable
improving.
likely 2nd chemo  monitor closely with hematuria
likely 2nd chemo  monitor closely with hematuria  stable
normalized
slightly better.  GI on the case.

## 2018-07-10 NOTE — PROGRESS NOTE ADULT - SUBJECTIVE AND OBJECTIVE BOX
Patient is a 57y old  Female who presents with a chief complaint of hematuria (09 Jul 2018 11:49)      INTERVAL HPI/OVERNIGHT EVENTS: c/o inner thigh rash developed 2days being in the hospital, nonpruritic, mild pain      Vital Signs Last 24 Hrs  T(C): 36.4 (10 Jul 2018 21:14), Max: 36.6 (10 Jul 2018 07:30)  T(F): 97.6 (10 Jul 2018 21:14), Max: 97.9 (10 Jul 2018 07:30)  HR: 109 (10 Jul 2018 21:14) (84 - 109)  BP: 122/77 (10 Jul 2018 21:14) (100/62 - 122/77)  BP(mean): --  RR: 18 (10 Jul 2018 21:14) (18 - 18)  SpO2: 100% (10 Jul 2018 21:14) (99% - 100%)    acetaminophen   Tablet. 650 milliGRAM(s) Oral every 6 hours PRN  loperamide Solution 2 milliGRAM(s) Oral four times a day PRN  sodium chloride 0.9% 1000 milliLiter(s) IV Continuous <Continuous>  sodium chloride 0.9%. 1000 milliLiter(s) IV Continuous <Continuous>      PHYSICAL EXAM:  GENERAL: NAD,   EYES: conjunctiva and sclera clear  ENMT: Moist mucous membranes  NECK: Supple, No JVD, Normal thyroid  NERVOUS SYSTEM:  Alert & Oriented X3,   CHEST/LUNG: Clear to auscultation bilaterally; No rales, rhonchi, wheezing, or rubs  HEART: Regular rate and rhythm; No murmurs, rubs, or gallops  ABDOMEN: Soft, Nontender, Nondistended; Bowel sounds present  EXTREMITIES:  2+ Peripheral Pulses, No clubbing, cyanosis, or edema  pigmented scaly innerthigh rash  LYMPH: No lymphadenopathy noted  SKIN: No rashes or lesions    Consultant(s) Notes Reviewed:  [x ] YES  [ ] NO  Care Discussed with Consultants/Other Providers [ x] YES  [ ] NO    LABS:                        9.0    7.40  )-----------( 85       ( 09 Jul 2018 09:24 )             26.9               CAPILLARY BLOOD GLUCOSE                RADIOLOGY & ADDITIONAL TESTS:    Imaging Personally Reviewed:  [ x] YES  [ ] NO

## 2018-07-10 NOTE — PROGRESS NOTE ADULT - ASSESSMENT
7/9 the notes were read from the weekend and at this time I could not retreive my last notes for unknown reasons. The pt was clearly better and there was a marked decrease in the diarrhea over the weekend. She can eat and will have a colonoscopy in the am. I will discuss with Dr Steen about the next chemo session as it is likely most if not all the symptoms she had here are from the drugs for the breast cancer.   7/10 the pt was to be discharged yesterday but she developed a rash on her upper inner thighs. She is to see the dermatologist before discharge if possible and was placed on Mycostatin She felt well otherwise and the diarrhea had stopped.

## 2018-07-11 VITALS — WEIGHT: 164.91 LBS

## 2018-07-11 PROCEDURE — 85027 COMPLETE CBC AUTOMATED: CPT

## 2018-07-11 PROCEDURE — 83735 ASSAY OF MAGNESIUM: CPT

## 2018-07-11 PROCEDURE — 87045 FECES CULTURE AEROBIC BACT: CPT

## 2018-07-11 PROCEDURE — 80048 BASIC METABOLIC PNL TOTAL CA: CPT

## 2018-07-11 PROCEDURE — 74176 CT ABD & PELVIS W/O CONTRAST: CPT

## 2018-07-11 PROCEDURE — 87177 OVA AND PARASITES SMEARS: CPT

## 2018-07-11 PROCEDURE — 80053 COMPREHEN METABOLIC PANEL: CPT

## 2018-07-11 PROCEDURE — 85610 PROTHROMBIN TIME: CPT

## 2018-07-11 PROCEDURE — 74177 CT ABD & PELVIS W/CONTRAST: CPT

## 2018-07-11 PROCEDURE — 87449 NOS EACH ORGANISM AG IA: CPT

## 2018-07-11 PROCEDURE — 87046 STOOL CULTR AEROBIC BACT EA: CPT

## 2018-07-11 PROCEDURE — 85730 THROMBOPLASTIN TIME PARTIAL: CPT

## 2018-07-11 PROCEDURE — 93005 ELECTROCARDIOGRAM TRACING: CPT

## 2018-07-11 PROCEDURE — 87507 IADNA-DNA/RNA PROBE TQ 12-25: CPT

## 2018-07-11 PROCEDURE — 84100 ASSAY OF PHOSPHORUS: CPT

## 2018-07-11 PROCEDURE — 86901 BLOOD TYPING SEROLOGIC RH(D): CPT

## 2018-07-11 PROCEDURE — 87086 URINE CULTURE/COLONY COUNT: CPT

## 2018-07-11 PROCEDURE — 87324 CLOSTRIDIUM AG IA: CPT

## 2018-07-11 PROCEDURE — 86900 BLOOD TYPING SEROLOGIC ABO: CPT

## 2018-07-11 PROCEDURE — 81001 URINALYSIS AUTO W/SCOPE: CPT

## 2018-07-11 PROCEDURE — 99285 EMERGENCY DEPT VISIT HI MDM: CPT | Mod: 25

## 2018-07-11 PROCEDURE — 86850 RBC ANTIBODY SCREEN: CPT

## 2018-07-11 PROCEDURE — 72110 X-RAY EXAM L-2 SPINE 4/>VWS: CPT

## 2018-07-11 PROCEDURE — 96374 THER/PROPH/DIAG INJ IV PUSH: CPT

## 2018-07-11 NOTE — DIETITIAN INITIAL EVALUATION ADULT. - ORAL INTAKE PTA
Pt reports good appetite and PO intake PTA. Pt typically has small frequent meals throughout the day. Pt denies micronutrient supplementation PTA. NKFA./good

## 2018-07-11 NOTE — CHART NOTE - NSCHARTNOTEFT_GEN_A_CORE
Per Dr. Contreras-Pt to be discharged today.  Pt agree and will be  by 5:30pm     Discharge paperwork in order.

## 2018-07-11 NOTE — PROGRESS NOTE ADULT - PROBLEM SELECTOR PROBLEM 2
Breast cancer
Malignant neoplasm of overlapping sites of left breast in female, estrogen receptor positive
Malignant neoplasm of female breast, unspecified estrogen receptor status, unspecified laterality, unspecified site of breast
Malignant neoplasm of overlapping sites of left breast in female, estrogen receptor positive
Breast cancer
Malignant neoplasm of female breast, unspecified estrogen receptor status, unspecified laterality, unspecified site of breast
Malignant neoplasm of overlapping sites of left breast in female, estrogen receptor positive

## 2018-07-11 NOTE — DIETITIAN INITIAL EVALUATION ADULT. - OTHER INFO
Pt seen for length of stay. Per chart, pt with breast cancer undergoing chemo a/w hematuria. Pt reports good appetite inhouse, consuming mostly >75% of meals. Pt reports there is more foods that she is able to tolerate inhouse than at home. Pt denies any history of chewing/swallowing difficulty or GI distress at this time. Last BM today. Pt amenable to nutrition education, provided and noted below.

## 2018-07-11 NOTE — PROGRESS NOTE ADULT - PROBLEM SELECTOR PROBLEM 3
Leukocytosis, unspecified type
Anemia due to other cause
Diarrhea
Anemia due to other cause
Diarrhea
Leukocytosis, unspecified type

## 2018-07-11 NOTE — PROGRESS NOTE ADULT - PROBLEM SELECTOR PLAN 2
per oncology
Chemo on hold
appreciate onc recs
Chemo on hold
appreciate onc recs
per oncology
pt s/p chemo on Wednesday and had neulasta and decadron afterwards  monitor wbc which likely is multifactorial with cystitis/medications  symptomatic relief of side effects  onc Dr. Escobar will follow monday

## 2018-07-11 NOTE — PROGRESS NOTE ADULT - ASSESSMENT
7/11 the pt was seen by derm and a ointment was prescribed. She was to go home yesterday but had 2 large watery BMs and iv fluids are again being given. I will relay this again to her oncologist and the last chemo may have to be delayed or eliminated.

## 2018-07-11 NOTE — DIETITIAN INITIAL EVALUATION ADULT. - NS AS NUTRI INTERV ED CONTENT
Encouraged PO intake. Discussed small, frequent meals high in protein and energy. Discussed lean sources of protein and protein/energy dense snacks. Discussed different ways to flavor food.

## 2018-07-11 NOTE — PROGRESS NOTE ADULT - PROBLEM SELECTOR PROBLEM 4
Anemia due to other cause
Diarrhea, unspecified type
Anemia due to other cause
Diarrhea
Diarrhea, unspecified type

## 2018-07-11 NOTE — PROGRESS NOTE ADULT - PROVIDER SPECIALTY LIST ADULT
Gastroenterology
Heme/Onc
Infectious Disease
Internal Medicine
Urology
Infectious Disease
Internal Medicine

## 2018-07-11 NOTE — DIETITIAN INITIAL EVALUATION ADULT. - ENERGY NEEDS
Ht: 64 Wt: 152 pounds BMI: 26.1 kg/m2 IBW:  120 pounds(+/-10%) %IBW 79%  No edema. No pressure ulcers documented.

## 2018-07-11 NOTE — PROGRESS NOTE ADULT - SUBJECTIVE AND OBJECTIVE BOX
[facility] Hematology/Oncology - Kecia Dhaliwal / [place] / Shawn - [phone]  Primary Outpatient Hematologist/Oncologist:     Subjective  Patient seen and examined. Sitting in the chair, had one loose BM this morning, no blood init and no hematuria today.    Admitting Complaint/History  HPI:  57 f with breast cancer on docetaxel and carboplatin last dose Wednesday p/w hematuria - initially dark red a day before admission followed by an episode of profuse diarrhea then  pinkish colored urine all day on day of admission.    Overall pt states she has been feeling very weak and fatigued since chemo session 3 weeks ago and has developed extreme sensitivity of abdomen to light touch (clothing, etc) though no actual pain.  No f/c.  Poor appetite with poor taste for food.  Gagging on medications after chemo (takes a brief course of decadron).  + watery diarrhea surrounding chemo a few times a day with waxing/waning course each day.  No n/v.  + abdominal bloating.  Pt denies dysuria, urinary frequency, urinary hesitancy and urinary urgency.  LMP at 41 yo.      Of note pt states that while chemo was running on Wednesday she felt a brief episode of chest pain.  Has never had cp before.  Was told likely 2nd to chemo as resolved immediately after chemo infused.  Pt mother  of CHF.  Pt had a TTE within the last 6 months.  no sob/cough.  Pt states she has once more chemo session left and planned for mastectomy in August.    In ED received keflex 500mg po and then ctx 1 g with NS 2L. (2018 23:13)    the notes were read from the weekend and at this time I could not retreive my last notes for unknown reasons. The pt was clearly better and there was a marked decrease in the diarrhea over the weekend. She can eat and will have a colonoscopy in the am. I will discuss with Dr Steen about the next chemo session as it is likely most if not all the symptoms she had here are from the drugs for the breast cancer. 7/10 the pt was to be discharged yesterday but she developed a rash on her upper inner thighs. She is to see the dermatologist before discharge if possible and was placed on Mycostatin She felt well otherwise and the diarrhea had stopped.   the pt was seen by derm and a ointment was prescribed. She was to go home yesterday but had 2 large watery BMs and iv fluids are again being given. I will relay this again to her oncologist and the last chemo may have to be delayed or eliminated.  Physical Exam:  General: AAO x 3. NAD.   HEENT: clear oropharynx, anicteric sclera, pink conjunctivae, EOMI  Cardiac: S1, S2 present. No audible murmurs. RRR  Lungs: CTA B/L.   Abdomen: Soft, Non-Tender, Non-Distended. No palpable hepatosplenomegaly  Extremities: No edema  Skin: No Rashes. No petechiae  Neuro: No focal motor or sensory deficits.    Vital Signs Last 24 Hrs  T(C): 36.6 (2018 07:32), Max: 36.6 (10 Jul 2018 15:50)  T(F): 97.9 (2018 07:32), Max: 97.9 (10 Jul 2018 15:50)  HR: 98 (2018 07:32) (84 - 109)  BP: 106/66 (2018 07:32) (100/62 - 122/77)  BP(mean): --  RR: 18 (2018 07:32) (18 - 18)  SpO2: 98% (2018 07:32) (98% - 100%)  Complete Blood Count in AM (18 @ 09:24)    WBC Count: 7.40 K/uL    RBC Count: 2.70: Specimen integrity verified. M/uL    Hemoglobin: 9.0 g/dL    Hematocrit: 26.9 %    Mean Cell Volume: 99.6 fl    Mean Cell Hemoglobin: 33.3 pg    Mean Cell Hemoglobin Conc: 33.5 gm/dL    Red Cell Distrib Width: 15.5 %    Platelet Count - Automated: 85: Smear Reviewed, Result Confirmed. K/uL  Basic Metabolic Panel in AM (18 @ 07:07)    Sodium, Serum: 140 mmol/L    Potassium, Serum: 3.2 mmol/L    Chloride, Serum: 102 mmol/L    Carbon Dioxide, Serum: 27 mmol/L    Anion Gap, Serum: 11 mmol/L    Blood Urea Nitrogen, Serum: 12 mg/dL    Creatinine, Serum: 0.79 mg/dL    Glucose, Serum: 107 mg/dL    Calcium, Total Serum: 8.5 mg/dL   MEDICATIONS  (STANDING):  sodium chloride 0.9% 1000 milliLiter(s) (100 mL/Hr) IV Continuous <Continuous>  sodium chloride 0.9%. 1000 milliLiter(s) (75 mL/Hr) IV Continuous <Continuous>    MEDICATIONS  (PRN):  acetaminophen   Tablet. 650 milliGRAM(s) Oral every 6 hours PRN Mild Pain (1 - 3)  loperamide Solution 2 milliGRAM(s) Oral four times a day PRN Diarrhea

## 2018-08-28 PROBLEM — R01.1 CARDIAC MURMUR, UNSPECIFIED: Chronic | Status: ACTIVE | Noted: 2018-06-30

## 2018-08-29 ENCOUNTER — RECORD ABSTRACTING (OUTPATIENT)
Age: 58
End: 2018-08-29

## 2018-08-31 ENCOUNTER — APPOINTMENT (OUTPATIENT)
Dept: CARDIOLOGY | Facility: CLINIC | Age: 58
End: 2018-08-31
Payer: MEDICAID

## 2018-08-31 VITALS
HEART RATE: 99 BPM | WEIGHT: 140 LBS | DIASTOLIC BLOOD PRESSURE: 60 MMHG | BODY MASS INDEX: 23.9 KG/M2 | RESPIRATION RATE: 18 BRPM | SYSTOLIC BLOOD PRESSURE: 120 MMHG | HEIGHT: 64 IN

## 2018-08-31 DIAGNOSIS — Z87.891 PERSONAL HISTORY OF NICOTINE DEPENDENCE: ICD-10-CM

## 2018-08-31 PROCEDURE — 99213 OFFICE O/P EST LOW 20 MIN: CPT

## 2018-08-31 PROCEDURE — 93000 ELECTROCARDIOGRAM COMPLETE: CPT

## 2018-08-31 PROCEDURE — 99204 OFFICE O/P NEW MOD 45 MIN: CPT

## 2018-09-10 ENCOUNTER — OUTPATIENT (OUTPATIENT)
Dept: OUTPATIENT SERVICES | Facility: HOSPITAL | Age: 58
LOS: 1 days | End: 2018-09-10
Payer: MEDICAID

## 2018-09-10 VITALS
TEMPERATURE: 98 F | HEIGHT: 63.5 IN | WEIGHT: 140.65 LBS | DIASTOLIC BLOOD PRESSURE: 78 MMHG | RESPIRATION RATE: 13 BRPM | HEART RATE: 59 BPM | SYSTOLIC BLOOD PRESSURE: 137 MMHG | OXYGEN SATURATION: 96 %

## 2018-09-10 VITALS — WEIGHT: 140.65 LBS | HEIGHT: 63.5 IN

## 2018-09-10 DIAGNOSIS — M95.4 ACQUIRED DEFORMITY OF CHEST AND RIB: ICD-10-CM

## 2018-09-10 DIAGNOSIS — Z98.890 OTHER SPECIFIED POSTPROCEDURAL STATES: Chronic | ICD-10-CM

## 2018-09-10 DIAGNOSIS — Z90.13 ACQUIRED ABSENCE OF BILATERAL BREASTS AND NIPPLES: ICD-10-CM

## 2018-09-10 DIAGNOSIS — Z98.891 HISTORY OF UTERINE SCAR FROM PREVIOUS SURGERY: Chronic | ICD-10-CM

## 2018-09-10 DIAGNOSIS — Z01.818 ENCOUNTER FOR OTHER PREPROCEDURAL EXAMINATION: ICD-10-CM

## 2018-09-10 DIAGNOSIS — C50.412 MALIGNANT NEOPLASM OF UPPER-OUTER QUADRANT OF LEFT FEMALE BREAST: ICD-10-CM

## 2018-09-10 DIAGNOSIS — Z90.710 ACQUIRED ABSENCE OF BOTH CERVIX AND UTERUS: Chronic | ICD-10-CM

## 2018-09-10 DIAGNOSIS — Z42.1 ENCOUNTER FOR BREAST RECONSTRUCTION FOLLOWING MASTECTOMY: ICD-10-CM

## 2018-09-10 LAB
BLD GP AB SCN SERPL QL: SIGNIFICANT CHANGE UP
HCT VFR BLD CALC: 33.9 % — LOW (ref 34.5–45)
HGB BLD-MCNC: 10.6 G/DL — LOW (ref 11.5–15.5)
MCHC RBC-ENTMCNC: 31.4 GM/DL — LOW (ref 32–36)
MCHC RBC-ENTMCNC: 33 PG — SIGNIFICANT CHANGE UP (ref 27–34)
MCV RBC AUTO: 104.8 FL — HIGH (ref 80–100)
PLATELET # BLD AUTO: 172 K/UL — SIGNIFICANT CHANGE UP (ref 150–400)
RBC # BLD: 3.23 M/UL — LOW (ref 3.8–5.2)
RBC # FLD: 12.6 % — SIGNIFICANT CHANGE UP (ref 10.3–14.5)
WBC # BLD: 5.9 K/UL — SIGNIFICANT CHANGE UP (ref 3.8–10.5)
WBC # FLD AUTO: 5.9 K/UL — SIGNIFICANT CHANGE UP (ref 3.8–10.5)

## 2018-09-10 NOTE — H&P PST ADULT - PROBLEM SELECTOR PLAN 1
Scheduled for left total mastectomy, right prophylactic mastectomy with b/l lymph node biopsy and b/l breast reconstruction w/expanders 9/17/18.    Pre-op lab obtained.  Recent EKG obtained.  Cardiac clearance reviewed pending echocardiogram.  Obtain medical clearance and updated cardiac clearance.

## 2018-09-10 NOTE — H&P PST ADULT - PMH
Asthma  last attack 2016.  Breast cancer  left  Malignant neoplasm of upper-outer quadrant of left female breast, unspecified estrogen receptor status    Murmur, heart

## 2018-09-10 NOTE — H&P PST ADULT - HISTORY OF PRESENT ILLNESS
57 y/o female presents to PST.  Diagnosed with left breast cancer  2/2018 with routine mammogram.  She states she felt a lump in the left  breast for some months prior to undergoing mammogram.  S/p chemo for 3 months.  Scheduled for left total mastectomy, right prophylactic mastectomy with b/l lymph node biopsy and b/l breast reconstruction w/expanders 9/17/18.

## 2018-09-10 NOTE — H&P PST ADULT - FAMILY HISTORY
Mother  Still living? No  Family history of CHF (congestive heart failure), Age at diagnosis: Age Unknown  Family history of rheumatoid arthritis, Age at diagnosis: Age Unknown     Father  Still living? No  Family history of diabetes mellitus, Age at diagnosis: Age Unknown

## 2018-09-10 NOTE — H&P PST ADULT - NSANTHOSAYNRD_GEN_A_CORE
No. STERLING screening performed.  STOP BANG Legend: 0-2 = LOW Risk; 3-4 = INTERMEDIATE Risk; 5-8 = HIGH Risk

## 2018-09-10 NOTE — H&P PST ADULT - PROBLEM SELECTOR PROBLEM 1
Malignant neoplasm of upper-outer quadrant of left female breast, unspecified estrogen receptor status

## 2018-09-11 ENCOUNTER — RESULT REVIEW (OUTPATIENT)
Age: 58
End: 2018-09-11

## 2018-09-11 ENCOUNTER — APPOINTMENT (OUTPATIENT)
Dept: CARDIOLOGY | Facility: CLINIC | Age: 58
End: 2018-09-11
Payer: MEDICAID

## 2018-09-11 PROBLEM — J45.909 UNSPECIFIED ASTHMA, UNCOMPLICATED: Chronic | Status: ACTIVE | Noted: 2018-06-30

## 2018-09-11 LAB — SURGICAL PATHOLOGY STUDY: SIGNIFICANT CHANGE UP

## 2018-09-11 PROCEDURE — 93306 TTE W/DOPPLER COMPLETE: CPT

## 2018-09-13 ENCOUNTER — RESULT REVIEW (OUTPATIENT)
Age: 58
End: 2018-09-13

## 2018-09-13 LAB — SURGICAL PATHOLOGY STUDY: SIGNIFICANT CHANGE UP

## 2018-09-13 PROCEDURE — 88321 CONSLTJ&REPRT SLD PREP ELSWR: CPT

## 2018-09-13 PROCEDURE — G0463: CPT

## 2018-09-13 PROCEDURE — 86850 RBC ANTIBODY SCREEN: CPT

## 2018-09-13 PROCEDURE — 85027 COMPLETE CBC AUTOMATED: CPT

## 2018-09-13 PROCEDURE — 86901 BLOOD TYPING SEROLOGIC RH(D): CPT

## 2018-09-13 PROCEDURE — 86900 BLOOD TYPING SEROLOGIC ABO: CPT

## 2018-09-13 PROCEDURE — 36415 COLL VENOUS BLD VENIPUNCTURE: CPT

## 2018-09-16 ENCOUNTER — TRANSCRIPTION ENCOUNTER (OUTPATIENT)
Age: 58
End: 2018-09-16

## 2018-09-17 ENCOUNTER — RESULT REVIEW (OUTPATIENT)
Age: 58
End: 2018-09-17

## 2018-09-17 ENCOUNTER — INPATIENT (INPATIENT)
Facility: HOSPITAL | Age: 58
LOS: 1 days | Discharge: HOME | DRG: 581 | End: 2018-09-19
Attending: SURGERY | Admitting: SURGERY
Payer: MEDICAID

## 2018-09-17 VITALS
HEART RATE: 90 BPM | TEMPERATURE: 98 F | WEIGHT: 140.88 LBS | DIASTOLIC BLOOD PRESSURE: 68 MMHG | SYSTOLIC BLOOD PRESSURE: 131 MMHG | OXYGEN SATURATION: 99 % | RESPIRATION RATE: 16 BRPM | HEIGHT: 63.5 IN

## 2018-09-17 DIAGNOSIS — Z90.710 ACQUIRED ABSENCE OF BOTH CERVIX AND UTERUS: Chronic | ICD-10-CM

## 2018-09-17 DIAGNOSIS — Z98.890 OTHER SPECIFIED POSTPROCEDURAL STATES: Chronic | ICD-10-CM

## 2018-09-17 DIAGNOSIS — Z90.13 ACQUIRED ABSENCE OF BILATERAL BREASTS AND NIPPLES: ICD-10-CM

## 2018-09-17 DIAGNOSIS — Z98.891 HISTORY OF UTERINE SCAR FROM PREVIOUS SURGERY: Chronic | ICD-10-CM

## 2018-09-17 PROBLEM — C50.412 MALIGNANT NEOPLASM OF UPPER-OUTER QUADRANT OF LEFT FEMALE BREAST: Chronic | Status: ACTIVE | Noted: 2018-09-10

## 2018-09-17 LAB — ABO RH CONFIRMATION: SIGNIFICANT CHANGE UP

## 2018-09-17 PROCEDURE — 88360 TUMOR IMMUNOHISTOCHEM/MANUAL: CPT | Mod: 26

## 2018-09-17 PROCEDURE — 88333 PATH CONSLTJ SURG CYTO XM 1: CPT | Mod: 26

## 2018-09-17 PROCEDURE — 88307 TISSUE EXAM BY PATHOLOGIST: CPT | Mod: 26

## 2018-09-17 PROCEDURE — 88377 M/PHMTRC ALYS ISHQUANT/SEMIQ: CPT | Mod: 26

## 2018-09-17 PROCEDURE — 88334 PATH CONSLTJ SURG CYTO XM EA: CPT | Mod: 26

## 2018-09-17 RX ORDER — OXYCODONE AND ACETAMINOPHEN 5; 325 MG/1; MG/1
1 TABLET ORAL ONCE
Qty: 0 | Refills: 0 | Status: DISCONTINUED | OUTPATIENT
Start: 2018-09-17 | End: 2018-09-17

## 2018-09-17 RX ORDER — SODIUM CHLORIDE 9 MG/ML
1000 INJECTION, SOLUTION INTRAVENOUS
Qty: 0 | Refills: 0 | Status: DISCONTINUED | OUTPATIENT
Start: 2018-09-17 | End: 2018-09-17

## 2018-09-17 RX ORDER — SODIUM CHLORIDE 9 MG/ML
1000 INJECTION, SOLUTION INTRAVENOUS
Qty: 0 | Refills: 0 | Status: DISCONTINUED | OUTPATIENT
Start: 2018-09-17 | End: 2018-09-19

## 2018-09-17 RX ORDER — CEFAZOLIN SODIUM 1 G
2000 VIAL (EA) INJECTION EVERY 8 HOURS
Qty: 0 | Refills: 0 | Status: DISCONTINUED | OUTPATIENT
Start: 2018-09-17 | End: 2018-09-19

## 2018-09-17 RX ORDER — HYDROMORPHONE HYDROCHLORIDE 2 MG/ML
0.5 INJECTION INTRAMUSCULAR; INTRAVENOUS; SUBCUTANEOUS
Qty: 0 | Refills: 0 | Status: DISCONTINUED | OUTPATIENT
Start: 2018-09-17 | End: 2018-09-17

## 2018-09-17 RX ORDER — ONDANSETRON 8 MG/1
4 TABLET, FILM COATED ORAL ONCE
Qty: 0 | Refills: 0 | Status: DISCONTINUED | OUTPATIENT
Start: 2018-09-17 | End: 2018-09-17

## 2018-09-17 RX ORDER — MORPHINE SULFATE 50 MG/1
2 CAPSULE, EXTENDED RELEASE ORAL EVERY 4 HOURS
Qty: 0 | Refills: 0 | Status: DISCONTINUED | OUTPATIENT
Start: 2018-09-17 | End: 2018-09-19

## 2018-09-17 RX ORDER — ACETAMINOPHEN 500 MG
650 TABLET ORAL EVERY 6 HOURS
Qty: 0 | Refills: 0 | Status: DISCONTINUED | OUTPATIENT
Start: 2018-09-17 | End: 2018-09-19

## 2018-09-17 RX ORDER — DOCUSATE SODIUM 100 MG
100 CAPSULE ORAL THREE TIMES A DAY
Qty: 0 | Refills: 0 | Status: DISCONTINUED | OUTPATIENT
Start: 2018-09-17 | End: 2018-09-19

## 2018-09-17 RX ORDER — ONDANSETRON 8 MG/1
4 TABLET, FILM COATED ORAL EVERY 6 HOURS
Qty: 0 | Refills: 0 | Status: DISCONTINUED | OUTPATIENT
Start: 2018-09-17 | End: 2018-09-19

## 2018-09-17 RX ORDER — OXYCODONE AND ACETAMINOPHEN 5; 325 MG/1; MG/1
1 TABLET ORAL EVERY 4 HOURS
Qty: 0 | Refills: 0 | Status: DISCONTINUED | OUTPATIENT
Start: 2018-09-17 | End: 2018-09-19

## 2018-09-17 RX ADMIN — Medication 100 MILLIGRAM(S): at 22:00

## 2018-09-17 RX ADMIN — Medication 100 MILLIGRAM(S): at 18:08

## 2018-09-17 RX ADMIN — MORPHINE SULFATE 2 MILLIGRAM(S): 50 CAPSULE, EXTENDED RELEASE ORAL at 20:15

## 2018-09-17 RX ADMIN — MORPHINE SULFATE 2 MILLIGRAM(S): 50 CAPSULE, EXTENDED RELEASE ORAL at 20:00

## 2018-09-17 RX ADMIN — SODIUM CHLORIDE 90 MILLILITER(S): 9 INJECTION, SOLUTION INTRAVENOUS at 16:22

## 2018-09-17 NOTE — PATIENT PROFILE ADULT. - PMH
Asthma    Breast cancer    Murmur, heart Asthma  last attack 2016.  Breast cancer  left  Malignant neoplasm of upper-outer quadrant of left female breast, unspecified estrogen receptor status    Murmur, heart

## 2018-09-17 NOTE — BRIEF OPERATIVE NOTE - PROCEDURE
<<-----Click on this checkbox to enter Procedure Mastectomy, bilateral, modified radical or simple, with tissue expander insertion  09/17/2018  with alloderm  Active  LCICCONETTI  Axillary node biopsy  09/17/2018  BILATERAL SENTINEL  Active  LCICCONETTI

## 2018-09-17 NOTE — PATIENT PROFILE ADULT. - GENERAL MEDS COMMENT, PROFILE
patient stated that she will take the Letrozole after the surgery as she didn't want to start before the surgery.

## 2018-09-17 NOTE — PATIENT PROFILE ADULT. - PSH
H/O abdominal hysterectomy H/O abdominal hysterectomy    H/O  section   with tubal ligation  H/O left breast biopsy  2018

## 2018-09-17 NOTE — BRIEF OPERATIVE NOTE - PRE-OP DX
Breast cancer  09/17/2018  bilateral  Active  Rubia Haro Breast cancer  09/17/2018  left  Active  Rubia Haro

## 2018-09-18 ENCOUNTER — TRANSCRIPTION ENCOUNTER (OUTPATIENT)
Age: 58
End: 2018-09-18

## 2018-09-18 DIAGNOSIS — C50.919 MALIGNANT NEOPLASM OF UNSPECIFIED SITE OF UNSPECIFIED FEMALE BREAST: ICD-10-CM

## 2018-09-18 RX ORDER — CEPHALEXIN 500 MG
1 CAPSULE ORAL
Qty: 28 | Refills: 0 | OUTPATIENT
Start: 2018-09-18 | End: 2018-09-24

## 2018-09-18 RX ADMIN — MORPHINE SULFATE 2 MILLIGRAM(S): 50 CAPSULE, EXTENDED RELEASE ORAL at 01:06

## 2018-09-18 RX ADMIN — MORPHINE SULFATE 2 MILLIGRAM(S): 50 CAPSULE, EXTENDED RELEASE ORAL at 14:07

## 2018-09-18 RX ADMIN — MORPHINE SULFATE 2 MILLIGRAM(S): 50 CAPSULE, EXTENDED RELEASE ORAL at 21:25

## 2018-09-18 RX ADMIN — Medication 100 MILLIGRAM(S): at 17:20

## 2018-09-18 RX ADMIN — MORPHINE SULFATE 2 MILLIGRAM(S): 50 CAPSULE, EXTENDED RELEASE ORAL at 14:20

## 2018-09-18 RX ADMIN — MORPHINE SULFATE 2 MILLIGRAM(S): 50 CAPSULE, EXTENDED RELEASE ORAL at 21:09

## 2018-09-18 RX ADMIN — Medication 100 MILLIGRAM(S): at 11:00

## 2018-09-18 RX ADMIN — Medication 100 MILLIGRAM(S): at 00:50

## 2018-09-18 RX ADMIN — SODIUM CHLORIDE 90 MILLILITER(S): 9 INJECTION, SOLUTION INTRAVENOUS at 03:30

## 2018-09-18 RX ADMIN — SODIUM CHLORIDE 90 MILLILITER(S): 9 INJECTION, SOLUTION INTRAVENOUS at 17:21

## 2018-09-18 RX ADMIN — MORPHINE SULFATE 2 MILLIGRAM(S): 50 CAPSULE, EXTENDED RELEASE ORAL at 00:51

## 2018-09-18 NOTE — DISCHARGE NOTE ADULT - PATIENT PORTAL LINK FT
You can access the [facility], offered by [facility], by registering with the following website: [email or web address]

## 2018-09-18 NOTE — PROGRESS NOTE ADULT - SUBJECTIVE AND OBJECTIVE BOX
POD #1    Bilateral mastectomy/reconstruction with expanders    Pt doing well. No new complaints. post op soreness.  Vital Signs Last 24 Hrs  T(C): 36.4 (18 Sep 2018 04:58), Max: 37.1 (17 Sep 2018 20:00)  T(F): 97.6 (18 Sep 2018 04:58), Max: 98.8 (17 Sep 2018 20:00)  HR: 71 (18 Sep 2018 04:58) (70 - 90)  BP: 105/64 (18 Sep 2018 04:58) (105/64 - 165/85)  BP(mean): --  RR: 16 (18 Sep 2018 04:58) (15 - 18)  SpO2: 100% (18 Sep 2018 04:58) (99% - 100%)    PHYSICAL EXAM:      Constitutional:NAD A&O x3    Breasts:surgical bra and dressings intact. merlin drains inract    Respiratory:CTA bilat    Cardiovascular:S1S2    Gastrointestinal:SNT ND    Extremities: no C/C/E.  PAS in place

## 2018-09-18 NOTE — DISCHARGE NOTE ADULT - HOSPITAL COURSE
on 9/17/18 pt admitted to [facility] underwent bilateral mastectomy with reconstruction /expanders

## 2018-09-18 NOTE — DISCHARGE NOTE ADULT - ADDITIONAL INSTRUCTIONS
Follow up with Dr Oviedo as scheduled.  Call Dr. Urena's office for follow up appointment per Dr Urena.

## 2018-09-18 NOTE — DISCHARGE NOTE ADULT - CARE PLAN
Principal Discharge DX:	Breast cancer  Goal:	per Dr alvarado/fam  Assessment and plan of treatment:	s/p bilat mastectomy/reconstruction

## 2018-09-18 NOTE — DISCHARGE NOTE ADULT - MEDICATION SUMMARY - MEDICATIONS TO TAKE
I will START or STAY ON the medications listed below when I get home from the hospital:    Percocet 5/325 oral tablet  -- 1 tab(s) by mouth every 8 hours -for moderate pain - for severe pain MDD:3  -- Caution federal law prohibits the transfer of this drug to any person other  than the person for whom it was prescribed.  May cause drowsiness.  Alcohol may intensify this effect.  Use care when operating dangerous machinery.  This prescription cannot be refilled.  This product contains acetaminophen.  Do not use  with any other product containing acetaminophen to prevent possible liver damage.  Using more of this medication than prescribed may cause serious breathing problems.    -- Indication: For post op pain    letrozole 2.5 mg oral tablet  -- 1 tab(s) by mouth once a day Patient has not started this med and will start after the surgery  -- Indication: For home    Keflex 500 mg oral capsule  -- 1 cap(s) by mouth 4 times a day   -- Finish all this medication unless otherwise directed by prescriber.    -- Indication: For post op    nystatin 100,000 units/g topical ointment  -- Apply on skin to affected area 2 times a day patient hasn't taken as of yet  -- For external use only.    -- Indication: For home

## 2018-09-18 NOTE — DISCHARGE NOTE ADULT - NS AS ACTIVITY OBS
Do not make important decisions/Walking-Outdoors allowed/Stairs allowed/Showering allowed/Do not drive or operate machinery/No Heavy lifting/straining Walking-Outdoors allowed/Do Not Lie Flat.  Wear bra at all times./No Heavy lifting/straining/Do not make important decisions/Showering allowed/Do not drive or operate machinery/Stairs allowed Do Not Lie Flat.  Wear bra at all times.sponge bath only/Stairs allowed/Walking-Outdoors allowed/No Heavy lifting/straining/Do not make important decisions/Do not drive or operate machinery

## 2018-09-18 NOTE — DISCHARGE NOTE ADULT - CARE PROVIDER_API CALL
Herve Oviedo), Plastic Surgery  [address] [place] [address]  Phone: [phone]  Fax: [phone]    Kaitlynn Urena), Derek mumtaz Velasco [facility]  [address]  [address]  [place] [address]  Phone: [phone]  Fax: [phone]

## 2018-09-18 NOTE — PHYSICAL THERAPY INITIAL EVALUATION ADULT - ADDITIONAL COMMENTS
pt lives in private house w/sons and dtr in law, 4 steps to enter w/hr, and 8 inside. pt ambulates independently

## 2018-09-19 VITALS
RESPIRATION RATE: 16 BRPM | SYSTOLIC BLOOD PRESSURE: 114 MMHG | TEMPERATURE: 98 F | HEART RATE: 90 BPM | OXYGEN SATURATION: 99 % | DIASTOLIC BLOOD PRESSURE: 61 MMHG

## 2018-09-19 LAB — SURGICAL PATHOLOGY STUDY: SIGNIFICANT CHANGE UP

## 2018-09-19 PROCEDURE — 97161 PT EVAL LOW COMPLEX 20 MIN: CPT

## 2018-09-19 PROCEDURE — 88333 PATH CONSLTJ SURG CYTO XM 1: CPT

## 2018-09-19 PROCEDURE — 88360 TUMOR IMMUNOHISTOCHEM/MANUAL: CPT

## 2018-09-19 PROCEDURE — 88334 PATH CONSLTJ SURG CYTO XM EA: CPT

## 2018-09-19 PROCEDURE — 88377 M/PHMTRC ALYS ISHQUANT/SEMIQ: CPT

## 2018-09-19 PROCEDURE — 88307 TISSUE EXAM BY PATHOLOGIST: CPT

## 2018-09-19 PROCEDURE — 99261: CPT

## 2018-09-19 PROCEDURE — C1789: CPT

## 2018-09-19 RX ADMIN — Medication 100 MILLIGRAM(S): at 09:09

## 2018-09-19 RX ADMIN — SODIUM CHLORIDE 90 MILLILITER(S): 9 INJECTION, SOLUTION INTRAVENOUS at 01:45

## 2018-09-19 RX ADMIN — Medication 100 MILLIGRAM(S): at 01:41

## 2018-09-19 RX ADMIN — OXYCODONE AND ACETAMINOPHEN 1 TABLET(S): 5; 325 TABLET ORAL at 12:40

## 2018-09-19 RX ADMIN — OXYCODONE AND ACETAMINOPHEN 1 TABLET(S): 5; 325 TABLET ORAL at 11:40

## 2018-10-02 ENCOUNTER — RX RENEWAL (OUTPATIENT)
Age: 58
End: 2018-10-02

## 2018-10-16 ENCOUNTER — APPOINTMENT (OUTPATIENT)
Dept: CARDIOLOGY | Facility: CLINIC | Age: 58
End: 2018-10-16

## 2018-11-29 ENCOUNTER — APPOINTMENT (OUTPATIENT)
Dept: CARDIOLOGY | Facility: CLINIC | Age: 58
End: 2018-11-29
Payer: MEDICAID

## 2018-11-29 VITALS
BODY MASS INDEX: 25.27 KG/M2 | HEART RATE: 87 BPM | HEIGHT: 64 IN | DIASTOLIC BLOOD PRESSURE: 78 MMHG | RESPIRATION RATE: 16 BRPM | SYSTOLIC BLOOD PRESSURE: 100 MMHG | WEIGHT: 148 LBS

## 2018-11-29 PROCEDURE — 93000 ELECTROCARDIOGRAM COMPLETE: CPT

## 2018-11-29 PROCEDURE — 99214 OFFICE O/P EST MOD 30 MIN: CPT

## 2018-11-29 RX ORDER — CHROMIUM 200 MCG
TABLET ORAL
Refills: 0 | Status: DISCONTINUED | COMMUNITY
End: 2018-11-29

## 2018-11-29 NOTE — PHYSICAL EXAM
[Normal Conjunctiva] : the conjunctiva exhibited no abnormalities [Eyelids - No Xanthelasma] : the eyelids demonstrated no xanthelasmas [Normal Oral Mucosa] : normal oral mucosa [No Oral Pallor] : no oral pallor [No Oral Cyanosis] : no oral cyanosis [Normal Jugular Venous A Waves Present] : normal jugular venous A waves present [Normal Jugular Venous V Waves Present] : normal jugular venous V waves present [No Jugular Venous Woodard A Waves] : no jugular venous woodard A waves [Respiration, Rhythm And Depth] : normal respiratory rhythm and effort [Exaggerated Use Of Accessory Muscles For Inspiration] : no accessory muscle use [Auscultation Breath Sounds / Voice Sounds] : lungs were clear to auscultation bilaterally [Heart Rate And Rhythm] : heart rate and rhythm were normal [Heart Sounds] : normal S1 and S2 [Systolic grade ___/6] : A grade [unfilled]/6 systolic murmur was heard. [FreeTextEntry1] : trace ankle edema L>R [Abdomen Soft] : soft [Abdomen Tenderness] : non-tender [Abdomen Mass (___ Cm)] : no abdominal mass palpated [Abnormal Walk] : normal gait [Gait - Sufficient For Exercise Testing] : the gait was sufficient for exercise testing [Nail Clubbing] : no clubbing of the fingernails [Cyanosis, Localized] : no localized cyanosis [Petechial Hemorrhages (___cm)] : no petechial hemorrhages [] : no ischemic changes [Oriented To Time, Place, And Person] : oriented to person, place, and time [Affect] : the affect was normal [Mood] : the mood was normal [No Anxiety] : not feeling anxious

## 2018-11-29 NOTE — REASON FOR VISIT
[FreeTextEntry1] : The patient is a 57-year-old female presenting here after undergoing bilateral mastectomies after diagnosis of a left breast cancer. \par Has had some bilateral anterior chest wall discomfort since the surgery.\par His undergoing regular Herceptin infusions.\par He is in the process of completing plastic surgical reconstruction of the breast.\par Silicone implants are planned January 25, 2019.\par Other major complaint is of neuropathy in her fingers and toes.\par \par She completed a course of chemotherapy under the care of Dr. Nicola Steen.  \par \par She has had some shortness of breath. She also describes some palpitations and some chest pain. \par She had a longstanding history of lower extremity edema dating back a number of years which is thought to have been due to venous insufficiency, that edema had actually been much better over much of the last two years and only resurfaced recently. She cannot identify any provocation. There is no PND or orthopnea. She is chronically fatigued. \par \par She has no history of any significant pulmonary hypertension. She has tolerated other surgeries in the remote past. \par \par No history of hyperlipidemia, diabetes, hypertension. She has had some hyperlipidemia.

## 2018-11-29 NOTE — REVIEW OF SYSTEMS
[Recent Weight Gain (___ Lbs)] : recent [unfilled] ~Ulb weight gain [Feeling Fatigued] : feeling fatigued [Shortness Of Breath] : no shortness of breath [Chest Pain] : no chest pain [Lower Ext Edema] : lower extremity edema [Palpitations] : no palpitations [Nausea] : no nausea [Vomiting] : no vomiting [Change in Appetite] : change in appetite [Change In The Stool] : no change in stool [Negative] : Heme/Lymph

## 2018-11-29 NOTE — ASSESSMENT
[FreeTextEntry1] : ECG:  Normal sinus rhythm at 87 bpm.  T-wave inversions inferiorly and laterally.  Perhaps not substantially changed from baseline. \par \par Echocardiogram performed 9/11/18:\par Normal left ventricular size and function. LVEF 55-60%\par Mild mitral regurgitation.\par Mild to moderate tricuspid regurgitation.\par There was no evidence of pulmonary hypertension.\par \par \par She has been tobacco free in the last year or so and I encouraged her to continue as such. \par \par The patient has had a prior history of venous insufficiency and edema which seems to have recurred for unclear reasons at this point in time.  \par \par I do not think there is an indication for chronic diuretic therapy, but Lasix 20 mg can be used on a p.r.n. basis when the edema becomes more pronounced. \par \par Does not appear to be any cardiac contraindication to her proceeding with the breast reconstruction surgery in January. \par \par A followup echocardiogram is planned to reassess left ventricular function on Herceptin therapy.

## 2019-01-11 ENCOUNTER — APPOINTMENT (OUTPATIENT)
Dept: CARDIOLOGY | Facility: CLINIC | Age: 59
End: 2019-01-11

## 2019-01-12 ENCOUNTER — APPOINTMENT (OUTPATIENT)
Dept: CARDIOLOGY | Facility: CLINIC | Age: 59
End: 2019-01-12
Payer: MEDICAID

## 2019-01-12 PROCEDURE — 93306 TTE W/DOPPLER COMPLETE: CPT

## 2019-01-14 ENCOUNTER — APPOINTMENT (OUTPATIENT)
Dept: CARDIOLOGY | Facility: CLINIC | Age: 59
End: 2019-01-14
Payer: MEDICAID

## 2019-01-14 VITALS
HEART RATE: 88 BPM | HEIGHT: 64 IN | DIASTOLIC BLOOD PRESSURE: 75 MMHG | WEIGHT: 148 LBS | RESPIRATION RATE: 16 BRPM | SYSTOLIC BLOOD PRESSURE: 110 MMHG | BODY MASS INDEX: 25.27 KG/M2

## 2019-01-14 PROCEDURE — 99214 OFFICE O/P EST MOD 30 MIN: CPT

## 2019-01-14 PROCEDURE — 93000 ELECTROCARDIOGRAM COMPLETE: CPT

## 2019-01-14 NOTE — ASSESSMENT
[FreeTextEntry1] : ECG:  Normal sinus rhythm at 88 bpm.  T-wave inversions inferiorly and laterally.  Perhaps not substantially changed from baseline. \par \par Echocardiogram performed 9/11/18:\par Normal left ventricular size and function. LVEF 55-60%\par Mild mitral regurgitation.\par Mild to moderate tricuspid regurgitation.\par There was no evidence of pulmonary hypertension.\par \par Followup echocardiogram 1/12/19. Technically very limited because of the saline implant.\par Overall normal LV size and function. Left ventricular ejection fraction is 60-65%.\par Mild to moderate tricuspid insufficiency.\par \par She has been tobacco free in the last year or so and I encouraged her to continue as such. \par \par The patient has had a prior history of venous insufficiency and edema has resolved for now.\par \par I do not think there is an indication for chronic diuretic therapy, but Lasix 20 mg can be used on a p.r.n. basis when the edema becomes more pronounced. \par \par Does not appear to be any cardiac contraindication to her proceeding with the breast reconstruction surgery in January. \par \par A followup echocardiogram is planned in 3 months  to reassess left ventricular function on Herceptin therapy.

## 2019-01-14 NOTE — PHYSICAL EXAM
[FreeTextEntry1] : Alert and oriented black female with alopecia in no apparent distress. [Normal Conjunctiva] : the conjunctiva exhibited no abnormalities [Eyelids - No Xanthelasma] : the eyelids demonstrated no xanthelasmas [Normal Oral Mucosa] : normal oral mucosa [No Oral Pallor] : no oral pallor [No Oral Cyanosis] : no oral cyanosis [Normal Jugular Venous A Waves Present] : normal jugular venous A waves present [Normal Jugular Venous V Waves Present] : normal jugular venous V waves present [No Jugular Venous Woodard A Waves] : no jugular venous woodard A waves [Respiration, Rhythm And Depth] : normal respiratory rhythm and effort [Exaggerated Use Of Accessory Muscles For Inspiration] : no accessory muscle use [Auscultation Breath Sounds / Voice Sounds] : lungs were clear to auscultation bilaterally [Heart Rate And Rhythm] : heart rate and rhythm were normal [Heart Sounds] : normal S1 and S2 [Systolic grade ___/6] : A grade [unfilled]/6 systolic murmur was heard. [Abdomen Soft] : soft [Abdomen Tenderness] : non-tender [Abdomen Mass (___ Cm)] : no abdominal mass palpated [Abnormal Walk] : normal gait [Gait - Sufficient For Exercise Testing] : the gait was sufficient for exercise testing [Nail Clubbing] : no clubbing of the fingernails [Cyanosis, Localized] : no localized cyanosis [Petechial Hemorrhages (___cm)] : no petechial hemorrhages [] : no ischemic changes [Oriented To Time, Place, And Person] : oriented to person, place, and time [Affect] : the affect was normal [Mood] : the mood was normal [No Anxiety] : not feeling anxious

## 2019-01-18 ENCOUNTER — OUTPATIENT (OUTPATIENT)
Dept: OUTPATIENT SERVICES | Facility: HOSPITAL | Age: 59
LOS: 1 days | End: 2019-01-18
Payer: MEDICAID

## 2019-01-18 VITALS
HEART RATE: 69 BPM | OXYGEN SATURATION: 98 % | WEIGHT: 149.91 LBS | RESPIRATION RATE: 18 BRPM | SYSTOLIC BLOOD PRESSURE: 125 MMHG | TEMPERATURE: 99 F | HEIGHT: 64 IN | DIASTOLIC BLOOD PRESSURE: 80 MMHG

## 2019-01-18 DIAGNOSIS — C50.919 MALIGNANT NEOPLASM OF UNSPECIFIED SITE OF UNSPECIFIED FEMALE BREAST: ICD-10-CM

## 2019-01-18 DIAGNOSIS — Z98.891 HISTORY OF UTERINE SCAR FROM PREVIOUS SURGERY: Chronic | ICD-10-CM

## 2019-01-18 DIAGNOSIS — Z90.13 ACQUIRED ABSENCE OF BILATERAL BREASTS AND NIPPLES: ICD-10-CM

## 2019-01-18 DIAGNOSIS — Z42.1 ENCOUNTER FOR BREAST RECONSTRUCTION FOLLOWING MASTECTOMY: ICD-10-CM

## 2019-01-18 DIAGNOSIS — M95.4 ACQUIRED DEFORMITY OF CHEST AND RIB: ICD-10-CM

## 2019-01-18 DIAGNOSIS — Z01.818 ENCOUNTER FOR OTHER PREPROCEDURAL EXAMINATION: ICD-10-CM

## 2019-01-18 DIAGNOSIS — Z98.82 BREAST IMPLANT STATUS: Chronic | ICD-10-CM

## 2019-01-18 DIAGNOSIS — Z90.710 ACQUIRED ABSENCE OF BOTH CERVIX AND UTERUS: Chronic | ICD-10-CM

## 2019-01-18 DIAGNOSIS — Z90.13 ACQUIRED ABSENCE OF BILATERAL BREASTS AND NIPPLES: Chronic | ICD-10-CM

## 2019-01-18 DIAGNOSIS — Z98.890 OTHER SPECIFIED POSTPROCEDURAL STATES: Chronic | ICD-10-CM

## 2019-01-18 PROCEDURE — G0463: CPT

## 2019-01-18 PROCEDURE — 85027 COMPLETE CBC AUTOMATED: CPT

## 2019-01-18 PROCEDURE — 80048 BASIC METABOLIC PNL TOTAL CA: CPT

## 2019-01-18 RX ORDER — SODIUM CHLORIDE 9 MG/ML
3 INJECTION INTRAMUSCULAR; INTRAVENOUS; SUBCUTANEOUS EVERY 8 HOURS
Qty: 0 | Refills: 0 | Status: DISCONTINUED | OUTPATIENT
Start: 2019-01-24 | End: 2019-02-08

## 2019-01-18 RX ORDER — LIDOCAINE HCL 20 MG/ML
0.2 VIAL (ML) INJECTION ONCE
Qty: 0 | Refills: 0 | Status: DISCONTINUED | OUTPATIENT
Start: 2019-01-24 | End: 2019-02-08

## 2019-01-18 RX ORDER — CEFAZOLIN SODIUM 1 G
2000 VIAL (EA) INJECTION ONCE
Qty: 0 | Refills: 0 | Status: DISCONTINUED | OUTPATIENT
Start: 2019-01-24 | End: 2019-02-08

## 2019-01-18 NOTE — H&P PST ADULT - HISTORY OF PRESENT ILLNESS
58yr old female with Hx of breast cancer coming in for  revision bilateral breast reconstruction with implant  acellular dermal matrix

## 2019-01-18 NOTE — H&P PST ADULT - PSH
H/O abdominal hysterectomy    H/O  section   with tubal ligation  H/O left breast biopsy  2018  History of bilateral mastectomy  2018 with expanders  History of breast reconstruction  Implants 2019

## 2019-01-18 NOTE — H&P PST ADULT - PMH
Asthma  last attack 2016.  Breast cancer  left chemo tx 4/2018  mediport insertion  Malignant neoplasm of upper-outer quadrant of left female breast, unspecified estrogen receptor status    Murmur, heart

## 2019-01-19 PROBLEM — C50.919 MALIGNANT NEOPLASM OF UNSPECIFIED SITE OF UNSPECIFIED FEMALE BREAST: Chronic | Status: ACTIVE | Noted: 2018-06-30

## 2019-01-24 ENCOUNTER — OUTPATIENT (OUTPATIENT)
Dept: OUTPATIENT SERVICES | Facility: HOSPITAL | Age: 59
LOS: 1 days | End: 2019-01-24
Payer: MEDICAID

## 2019-01-24 ENCOUNTER — RESULT REVIEW (OUTPATIENT)
Age: 59
End: 2019-01-24

## 2019-01-24 VITALS
RESPIRATION RATE: 18 BRPM | OXYGEN SATURATION: 100 % | HEART RATE: 69 BPM | TEMPERATURE: 99 F | HEIGHT: 64 IN | WEIGHT: 149.91 LBS | SYSTOLIC BLOOD PRESSURE: 125 MMHG | DIASTOLIC BLOOD PRESSURE: 80 MMHG

## 2019-01-24 VITALS
SYSTOLIC BLOOD PRESSURE: 139 MMHG | OXYGEN SATURATION: 100 % | RESPIRATION RATE: 18 BRPM | DIASTOLIC BLOOD PRESSURE: 78 MMHG | HEART RATE: 69 BPM | TEMPERATURE: 97 F

## 2019-01-24 DIAGNOSIS — Z90.13 ACQUIRED ABSENCE OF BILATERAL BREASTS AND NIPPLES: ICD-10-CM

## 2019-01-24 DIAGNOSIS — Z98.891 HISTORY OF UTERINE SCAR FROM PREVIOUS SURGERY: Chronic | ICD-10-CM

## 2019-01-24 DIAGNOSIS — Z90.710 ACQUIRED ABSENCE OF BOTH CERVIX AND UTERUS: Chronic | ICD-10-CM

## 2019-01-24 DIAGNOSIS — Z42.1 ENCOUNTER FOR BREAST RECONSTRUCTION FOLLOWING MASTECTOMY: ICD-10-CM

## 2019-01-24 DIAGNOSIS — M95.4 ACQUIRED DEFORMITY OF CHEST AND RIB: ICD-10-CM

## 2019-01-24 DIAGNOSIS — Z90.13 ACQUIRED ABSENCE OF BILATERAL BREASTS AND NIPPLES: Chronic | ICD-10-CM

## 2019-01-24 DIAGNOSIS — Z98.82 BREAST IMPLANT STATUS: Chronic | ICD-10-CM

## 2019-01-24 DIAGNOSIS — Z98.890 OTHER SPECIFIED POSTPROCEDURAL STATES: Chronic | ICD-10-CM

## 2019-01-24 PROCEDURE — 99261: CPT

## 2019-01-24 PROCEDURE — 88304 TISSUE EXAM BY PATHOLOGIST: CPT | Mod: 26

## 2019-01-24 PROCEDURE — 19342 INSJ/RPLCMT BRST IMPLT SEP D: CPT | Mod: 50

## 2019-01-24 PROCEDURE — 88304 TISSUE EXAM BY PATHOLOGIST: CPT

## 2019-01-24 PROCEDURE — C1789: CPT

## 2019-01-24 PROCEDURE — 15777 ACELLULAR DERM MATRIX IMPLT: CPT | Mod: 50

## 2019-01-24 RX ORDER — SODIUM CHLORIDE 9 MG/ML
1000 INJECTION, SOLUTION INTRAVENOUS
Qty: 0 | Refills: 0 | Status: DISCONTINUED | OUTPATIENT
Start: 2019-01-24 | End: 2019-02-08

## 2019-01-24 RX ORDER — HYDROMORPHONE HYDROCHLORIDE 2 MG/ML
0.5 INJECTION INTRAMUSCULAR; INTRAVENOUS; SUBCUTANEOUS
Qty: 0 | Refills: 0 | Status: DISCONTINUED | OUTPATIENT
Start: 2019-01-24 | End: 2019-01-24

## 2019-01-24 RX ORDER — LETROZOLE 2.5 MG/1
1 TABLET, FILM COATED ORAL
Qty: 0 | Refills: 0 | COMMUNITY

## 2019-01-24 RX ORDER — CELECOXIB 200 MG/1
200 CAPSULE ORAL ONCE
Qty: 0 | Refills: 0 | Status: DISCONTINUED | OUTPATIENT
Start: 2019-01-24 | End: 2019-01-24

## 2019-01-24 RX ORDER — HYDROMORPHONE HYDROCHLORIDE 2 MG/ML
0.25 INJECTION INTRAMUSCULAR; INTRAVENOUS; SUBCUTANEOUS
Qty: 0 | Refills: 0 | Status: DISCONTINUED | OUTPATIENT
Start: 2019-01-24 | End: 2019-01-24

## 2019-01-24 RX ORDER — ACETAMINOPHEN 500 MG
1000 TABLET ORAL ONCE
Qty: 0 | Refills: 0 | Status: COMPLETED | OUTPATIENT
Start: 2019-01-24 | End: 2019-01-24

## 2019-01-24 RX ORDER — ACETAMINOPHEN 500 MG
1000 TABLET ORAL ONCE
Qty: 0 | Refills: 0 | Status: DISCONTINUED | OUTPATIENT
Start: 2019-01-24 | End: 2019-02-08

## 2019-01-24 RX ORDER — TRASTUZUMAB-DKST 420 MG/20ML
0 INJECTION, POWDER, LYOPHILIZED, FOR SOLUTION INTRAVENOUS
Qty: 0 | Refills: 0 | COMMUNITY

## 2019-01-24 RX ORDER — CELECOXIB 200 MG/1
200 CAPSULE ORAL ONCE
Qty: 0 | Refills: 0 | Status: COMPLETED | OUTPATIENT
Start: 2019-01-24 | End: 2019-01-24

## 2019-01-24 RX ORDER — ONDANSETRON 8 MG/1
4 TABLET, FILM COATED ORAL ONCE
Qty: 0 | Refills: 0 | Status: COMPLETED | OUTPATIENT
Start: 2019-01-24 | End: 2019-01-24

## 2019-01-24 RX ORDER — OXYCODONE HYDROCHLORIDE 5 MG/1
10 TABLET ORAL ONCE
Qty: 0 | Refills: 0 | Status: DISCONTINUED | OUTPATIENT
Start: 2019-01-24 | End: 2019-01-24

## 2019-01-24 RX ORDER — OXYCODONE HYDROCHLORIDE 5 MG/1
5 TABLET ORAL ONCE
Qty: 0 | Refills: 0 | Status: DISCONTINUED | OUTPATIENT
Start: 2019-01-24 | End: 2019-01-24

## 2019-01-24 RX ORDER — APREPITANT 80 MG/1
40 CAPSULE ORAL ONCE
Qty: 0 | Refills: 0 | Status: COMPLETED | OUTPATIENT
Start: 2019-01-24 | End: 2019-01-24

## 2019-01-24 RX ADMIN — OXYCODONE HYDROCHLORIDE 5 MILLIGRAM(S): 5 TABLET ORAL at 13:25

## 2019-01-24 RX ADMIN — ONDANSETRON 4 MILLIGRAM(S): 8 TABLET, FILM COATED ORAL at 13:29

## 2019-01-24 RX ADMIN — OXYCODONE HYDROCHLORIDE 5 MILLIGRAM(S): 5 TABLET ORAL at 14:15

## 2019-01-24 RX ADMIN — SODIUM CHLORIDE 3 MILLILITER(S): 9 INJECTION INTRAMUSCULAR; INTRAVENOUS; SUBCUTANEOUS at 09:18

## 2019-01-24 NOTE — ASU DISCHARGE PLAN (ADULT/PEDIATRIC). - INSTRUCTIONS
Sleep/lie on back only elevated 30 degrees  Keep arms down at sides amap  Empty & record SERINA drains BID regular diet

## 2019-01-24 NOTE — ASU DISCHARGE PLAN (ADULT/PEDIATRIC). - CONDITIONS AT DISCHARGE
vss,afebrile, pt reactive to anesthesia, pt has met discharge criteria, discharge instructions and verbalized understanding. iv d/c'd, transport at .

## 2019-01-24 NOTE — ASU DISCHARGE PLAN (ADULT/PEDIATRIC). - SPECIAL INSTRUCTIONS
colace 3 x day to prevent constipation   miralax 1 xday 8 oz water to prevent constipation  drink fluids  good fiber diet

## 2019-01-24 NOTE — ASU DISCHARGE PLAN (ADULT/PEDIATRIC). - NOTIFY
Persistent Nausea and Vomiting/Fever greater than 101/Bleeding that does not stop/Swelling that continues/GYN Fever>100.4/Numbness, tingling

## 2019-01-29 LAB — SURGICAL PATHOLOGY STUDY: SIGNIFICANT CHANGE UP

## 2019-04-01 ENCOUNTER — RX RENEWAL (OUTPATIENT)
Age: 59
End: 2019-04-01

## 2019-05-01 ENCOUNTER — APPOINTMENT (OUTPATIENT)
Dept: CARDIOLOGY | Facility: CLINIC | Age: 59
End: 2019-05-01
Payer: MEDICAID

## 2019-05-01 ENCOUNTER — NON-APPOINTMENT (OUTPATIENT)
Age: 59
End: 2019-05-01

## 2019-05-01 VITALS
HEIGHT: 64 IN | BODY MASS INDEX: 26.29 KG/M2 | SYSTOLIC BLOOD PRESSURE: 140 MMHG | DIASTOLIC BLOOD PRESSURE: 82 MMHG | HEART RATE: 74 BPM | WEIGHT: 154 LBS | RESPIRATION RATE: 16 BRPM

## 2019-05-01 PROCEDURE — 93000 ELECTROCARDIOGRAM COMPLETE: CPT

## 2019-05-01 PROCEDURE — 99214 OFFICE O/P EST MOD 30 MIN: CPT

## 2019-05-01 RX ORDER — FUROSEMIDE 20 MG/1
20 TABLET ORAL DAILY
Qty: 30 | Refills: 3 | Status: DISCONTINUED | COMMUNITY
Start: 2018-08-31 | End: 2019-05-01

## 2019-05-01 NOTE — ASSESSMENT
[FreeTextEntry1] : ECG:  Normal sinus rhythm at 74 bpm.  T-wave inversions inferiorly and laterally.  Perhaps not substantially changed from baseline. \par \par Echocardiogram performed 9/11/18:\par Normal left ventricular size and function. LVEF 55-60%\par Mild mitral regurgitation.\par Mild to moderate tricuspid regurgitation.\par There was no evidence of pulmonary hypertension.\par \par Followup echocardiogram 1/12/19. Technically very limited because of the saline implant.\par Overall normal LV size and function. Left ventricular ejection fraction is 60-65%.\par Mild to moderate tricuspid insufficiency.\par \par She has been tobacco free in the last year or so and I encouraged her to continue as such. \par \par The patient has had a prior history of venous insufficiency and edema has resolved for now.\par \par I do not think there is an indication for chronic diuretic therapy, but Lasix 20 mg can be used on a p.r.n. basis when the edema becomes more pronounced. \par \par Does not appear to be any cardiac contraindication to her proceeding with the EGD and Cholecystectomy\par \par A followup echocardiogram is planned in 3 months  to reassess left ventricular function on Herceptin therapy.

## 2019-05-01 NOTE — REASON FOR VISIT
[FreeTextEntry1] : The patient is a 58-year-old female presenting here anticipating EGD and a cholecystectomy.\par recently had bilateral mastectomies after diagnosis of a left breast cancer. This was followed by  plastic surgical reconstruction of the breast.\par Silicone implants are planned January 25, 2019.\par Has had some bilateral anterior chest wall discomfort since the surgery.\par Has been undergoing regular Herceptin infusions.\par \par Other major complaint is of neuropathy in her fingers and toes.\par \par She completed a course of chemotherapy under the care of Dr. Nicola Steen.  \par \par her shortness of breath and palpitations have resolved.\par She had a longstanding history of lower extremity edema dating back a number of years which is thought to have been due to venous insufficiency, that edema had actually been much better over much of the last two years and only resurfaced recently. She cannot identify any provocation. There is no PND or orthopnea. She is chronically fatigued. \par \par She has no history of any significant pulmonary hypertension. She has tolerated other surgeries in the remote past. \par \par No history of hyperlipidemia, diabetes, hypertension. She has had some hyperlipidemia.

## 2019-05-01 NOTE — PHYSICAL EXAM
[FreeTextEntry1] : Alert and oriented black female with alopecia in no apparent distress. [Normal Conjunctiva] : the conjunctiva exhibited no abnormalities [Eyelids - No Xanthelasma] : the eyelids demonstrated no xanthelasmas [Normal Oral Mucosa] : normal oral mucosa [No Oral Pallor] : no oral pallor [No Oral Cyanosis] : no oral cyanosis [Normal Jugular Venous V Waves Present] : normal jugular venous V waves present [Normal Jugular Venous A Waves Present] : normal jugular venous A waves present [Respiration, Rhythm And Depth] : normal respiratory rhythm and effort [No Jugular Venous Woodard A Waves] : no jugular venous woodard A waves [Exaggerated Use Of Accessory Muscles For Inspiration] : no accessory muscle use [Auscultation Breath Sounds / Voice Sounds] : lungs were clear to auscultation bilaterally [Heart Sounds] : normal S1 and S2 [Heart Rate And Rhythm] : heart rate and rhythm were normal [Systolic grade ___/6] : A grade [unfilled]/6 systolic murmur was heard. [Abdomen Soft] : soft [Abdomen Tenderness] : non-tender [Abdomen Mass (___ Cm)] : no abdominal mass palpated [Abnormal Walk] : normal gait [Gait - Sufficient For Exercise Testing] : the gait was sufficient for exercise testing [Nail Clubbing] : no clubbing of the fingernails [Cyanosis, Localized] : no localized cyanosis [Skin Color & Pigmentation] : normal skin color and pigmentation [Petechial Hemorrhages (___cm)] : no petechial hemorrhages [Skin Lesions] : no skin lesions [] : no rash [No Venous Stasis] : no venous stasis [No Xanthoma] : no  xanthoma was observed [No Skin Ulcers] : no skin ulcer [Oriented To Time, Place, And Person] : oriented to person, place, and time [Mood] : the mood was normal [Affect] : the affect was normal [No Anxiety] : not feeling anxious

## 2019-05-29 ENCOUNTER — RX CHANGE (OUTPATIENT)
Age: 59
End: 2019-05-29

## 2019-06-18 ENCOUNTER — RESULT REVIEW (OUTPATIENT)
Age: 59
End: 2019-06-18

## 2019-09-10 NOTE — PHYSICAL THERAPY INITIAL EVALUATION ADULT - LEVEL OF INDEPENDENCE: SIT/SUPINE, REHAB EVAL
pt oob in chair Xenograft Text: The defect edges were debeveled with a #15 scalpel blade.  Given the location of the defect, shape of the defect and the proximity to free margins a xenograft was deemed most appropriate.  The graft was then trimmed to fit the size of the defect.  The graft was then placed in the primary defect and oriented appropriately.

## 2019-12-16 ENCOUNTER — APPOINTMENT (OUTPATIENT)
Dept: CARDIOLOGY | Facility: CLINIC | Age: 59
End: 2019-12-16
Payer: MEDICAID

## 2019-12-16 ENCOUNTER — NON-APPOINTMENT (OUTPATIENT)
Age: 59
End: 2019-12-16

## 2019-12-16 VITALS
WEIGHT: 167 LBS | HEIGHT: 63 IN | BODY MASS INDEX: 29.59 KG/M2 | RESPIRATION RATE: 16 BRPM | HEART RATE: 63 BPM | SYSTOLIC BLOOD PRESSURE: 130 MMHG | OXYGEN SATURATION: 99 % | DIASTOLIC BLOOD PRESSURE: 80 MMHG

## 2019-12-16 DIAGNOSIS — I87.2 VENOUS INSUFFICIENCY (CHRONIC) (PERIPHERAL): ICD-10-CM

## 2019-12-16 DIAGNOSIS — R60.9 EDEMA, UNSPECIFIED: ICD-10-CM

## 2019-12-16 PROCEDURE — 93000 ELECTROCARDIOGRAM COMPLETE: CPT

## 2019-12-16 PROCEDURE — 99214 OFFICE O/P EST MOD 30 MIN: CPT

## 2019-12-16 NOTE — ASSESSMENT
[FreeTextEntry1] : ECG:  Normal sinus rhythm at 63 bpm and WNL\par \par Lab data 6/4/19 ( ordered by Dr. Sobeida Jones)\par Chol. 190\par LDL  107\par HDL   73\par Creat.1.0\par HGB 12.1\par \par Echocardiogram performed 9/11/18:\par Normal left ventricular size and function. LVEF 55-60%\par Mild mitral regurgitation.\par Mild to moderate tricuspid regurgitation.\par There was no evidence of pulmonary hypertension.\par \par Followup echocardiogram 1/12/19. Technically very limited because of the saline implant.\par Overall normal LV size and function. Left ventricular ejection fraction is 60-65%.\par Mild to moderate tricuspid insufficiency.\par \par Impression\par \par 1. Restarted smoking.\par \par 2. Prior history of venous insufficiency and edema has resolved for now. No longer on diuretic therapy\par \par 3.Blood pressure continues to be controlled. Today 130/80 ECG is SR and unremarkable.\par \par 4.Noted 13 lb weight gain since May which she is aware of, BMI 29.\par \par 5.No anginal discomfort at rest or with exertion.\par \par 6. No longer needs serial echo as Herceptin infusion therapy has completed. Last Echo with  normal LV EF and mild-mod. valvular insufficiency\par \par Plan\par 1. No changes to be made her to pharmacological management at this time. Knows to take Lasix PRN for L/E     \par edema.\par \par 2. Diet and exercise have been encouraged.\par \par 3. Discussed the importance of smoking cessation.\par \par 4. Continue to F/U with PCP as scheduled and have all blood work faxed to our office. \par \par Based on her overall stable cardiac pattern and the absence of any associated symptoms clinical follow up can be made in one year\par \par \par

## 2019-12-16 NOTE — PHYSICAL EXAM
[Eyelids - No Xanthelasma] : the eyelids demonstrated no xanthelasmas [Normal Conjunctiva] : the conjunctiva exhibited no abnormalities [Normal Oral Mucosa] : normal oral mucosa [No Oral Pallor] : no oral pallor [No Oral Cyanosis] : no oral cyanosis [Normal Jugular Venous A Waves Present] : normal jugular venous A waves present [Normal Jugular Venous V Waves Present] : normal jugular venous V waves present [No Jugular Venous Woodard A Waves] : no jugular venous woodard A waves [Respiration, Rhythm And Depth] : normal respiratory rhythm and effort [Exaggerated Use Of Accessory Muscles For Inspiration] : no accessory muscle use [Auscultation Breath Sounds / Voice Sounds] : lungs were clear to auscultation bilaterally [Heart Rate And Rhythm] : heart rate and rhythm were normal [Heart Sounds] : normal S1 and S2 [Abdomen Soft] : soft [Systolic grade ___/6] : A grade [unfilled]/6 systolic murmur was heard. [Abdomen Mass (___ Cm)] : no abdominal mass palpated [Abdomen Tenderness] : non-tender [Abnormal Walk] : normal gait [Gait - Sufficient For Exercise Testing] : the gait was sufficient for exercise testing [Nail Clubbing] : no clubbing of the fingernails [Cyanosis, Localized] : no localized cyanosis [Petechial Hemorrhages (___cm)] : no petechial hemorrhages [No Venous Stasis] : no venous stasis [] : no rash [Skin Color & Pigmentation] : normal skin color and pigmentation [No Skin Ulcers] : no skin ulcer [Skin Lesions] : no skin lesions [No Xanthoma] : no  xanthoma was observed [Oriented To Time, Place, And Person] : oriented to person, place, and time [Mood] : the mood was normal [Affect] : the affect was normal [No Anxiety] : not feeling anxious [FreeTextEntry1] : Alert and oriented black female with alopecia in no apparent distress.

## 2019-12-16 NOTE — HISTORY OF PRESENT ILLNESS
[FreeTextEntry1] : Continues to complain about  neuropathy in her fingers and toes.\par \par Unfortunately has restarted smoking, although she says does not smoke every day.\par \par She had a longstanding history of lower extremity edema dating back a number of years which is thought to have been due to venous insufficiency. Previously on a diuretic which was stopped as her edema has resolved.\par \par Denies any chest pain, SOB palpitations.\par \par Blood work collected on 6/4/19 will be reviewed\par \par There is noted weight gain which she is aware of and states she make poor dietary choices. \par

## 2019-12-16 NOTE — REVIEW OF SYSTEMS
[Feeling Fatigued] : feeling fatigued [Lower Ext Edema] : lower extremity edema [Change in Appetite] : change in appetite [Negative] : Heme/Lymph [Recent Weight Gain (___ Lbs)] : recent [unfilled] ~Ulb weight gain [Shortness Of Breath] : no shortness of breath [Chest Pain] : no chest pain [Palpitations] : no palpitations [Nausea] : no nausea [Vomiting] : no vomiting [Change In The Stool] : no change in stool [Numbness (Hypesthesia)] : numbness [Tingling (Paresthesia)] : tingling

## 2019-12-16 NOTE — REASON FOR VISIT
[FreeTextEntry1] : The patient is a 59-year-old female presenting here cardiac revaluation. Her cardiac history includes:\par 1. Dyslipidemia\par 2. Venous insufficiency\par 3. Nonrheumatic mitral valve regurgitation.\par 4. Hx of b/l leg edema\par \par \par There is a history of  bilateral mastectomies after diagnosis of a left breast cancer. This was followed by  plastic surgical reconstruction of the breast. Silicone implants are planned January 25, 2019.\par \par History of bilateral anterior chest wall discomfort since the surgery.\par Completed Herceptin infusions, now on Letrozole 2.5 mg.\par \par \par \par \par

## 2020-01-29 NOTE — DIETITIAN INITIAL EVALUATION ADULT. - NUTRITION INTERVENTION
Progress Note - Thoracic Surgery   Waldo Bray 71 y o  male MRN: [id number]  Unit/Bed#: [facility] 518-01 Encounter: [id number]    Assessment:  71 M with esophageal adenocarcinoma status post transhiatal esophagectomy    Plan:  Continue NPO/NGT  IV fluids  Continue [place]  Continue penrose  Pain control with epidural, consider decreasing dose  Aggressive pulmonary toilet  Out of bed and ambulate  Heparin prophylaxis    Subjective/Objective     Subjective: No acute events overnight  Complaining of difficulty taking deep breaths  Pain is controlled  Denies nausea or vomiting  Objective:    Blood pressure 97/52, pulse 96, temperature 98 7 °F (37 1 °C), temperature source Oral, resp  rate 22, height 5' 10" (1 778 m), weight 86 6 kg (191 lb), SpO2 99 %  ,Body mass index is 27 41 kg/m²  Intake/Output Summary (Last 24 hours) at 1/29/2020 0622  Last data filed at 1/29/2020 0400  Gross per 24 hour   Intake 24710 59 ml   Output 2870 ml   Net 8332  59 ml       Invasive Devices     Central Venous Catheter Line            Port A Cath 09/26/19 Left Chest 124 days          Peripheral Intravenous Line            Peripheral IV 01/28/20 Left Hand less than 1 day    Peripheral IV 01/28/20 Right Arm less than 1 day          Epidural Line            Epidural Catheter 01/28/20 less than 1 day          Arterial Line            Arterial Line 01/28/20 Left Radial less than 1 day          Drain            Gastrostomy/Enterostomy Jejunostomy 14 Fr   days    NG/OG/Enteral Tube Nasogastric Left nares less than 1 day    Open Drain Anterior; Left Neck less than 1 day    Urethral Catheter Latex 16 Fr  less than 1 day                Physical Exam:   General: NAD, AAOx3  Eyes: PERRL  ENT: moist mucous membranes  Neck: supple, dressings c/d/i  CV: RRR +S1/S2  Chest: breath sounds bilaterally  Abdomen: soft, NT ND, incision c/d/i  J tube in place  Extremities: atraumatic, no edema      Results from last 7 days   Lab Units 01/29/20  1356 01/28/20  1755 01/28/20  1349   WBC Thousand/uL 6 47 7 10 6 18   HEMOGLOBIN g/dL 10 8* 11 4* 10 4*   HEMATOCRIT % 34 8* 36 3* 33 8*   PLATELETS Thousands/uL 109* 100* 104*     Results from last 7 days   Lab Units 01/29/20  0432 01/28/20  1755 01/28/20  1350 01/28/20  1241   POTASSIUM mmol/L 4 3 4 1 3 8  --    CHLORIDE mmol/L 112* 112* 116*  --    CO2 mmol/L 23 21 23  --    CO2, I-STAT mmol/L  --   --   --  23   BUN mg/dL 15 14 16  --    CREATININE mg/dL 0 75 0 69 0 69  --    GLUCOSE, ISTAT mg/dl  --   --   --  140   CALCIUM mg/dL 8 0* 7 8* 7 4*  -- Nutrition Education/Meals and Snack

## 2020-02-28 ENCOUNTER — RESULT REVIEW (OUTPATIENT)
Age: 60
End: 2020-02-28

## 2020-05-26 ENCOUNTER — OUTPATIENT (OUTPATIENT)
Dept: OUTPATIENT SERVICES | Facility: HOSPITAL | Age: 60
LOS: 1 days | Discharge: HOME | End: 2020-05-26

## 2020-05-26 DIAGNOSIS — C50.912 MALIGNANT NEOPLASM OF UNSPECIFIED SITE OF LEFT FEMALE BREAST: ICD-10-CM

## 2020-05-26 DIAGNOSIS — Z90.710 ACQUIRED ABSENCE OF BOTH CERVIX AND UTERUS: Chronic | ICD-10-CM

## 2020-05-26 DIAGNOSIS — Z98.82 BREAST IMPLANT STATUS: Chronic | ICD-10-CM

## 2020-05-26 DIAGNOSIS — Z98.891 HISTORY OF UTERINE SCAR FROM PREVIOUS SURGERY: Chronic | ICD-10-CM

## 2020-05-26 DIAGNOSIS — Z90.13 ACQUIRED ABSENCE OF BILATERAL BREASTS AND NIPPLES: Chronic | ICD-10-CM

## 2020-05-26 DIAGNOSIS — Z98.890 OTHER SPECIFIED POSTPROCEDURAL STATES: Chronic | ICD-10-CM

## 2020-06-01 ENCOUNTER — APPOINTMENT (OUTPATIENT)
Dept: HEMATOLOGY ONCOLOGY | Facility: CLINIC | Age: 60
End: 2020-06-01
Payer: MEDICAID

## 2020-06-01 NOTE — HISTORY OF PRESENT ILLNESS
[de-identified] : Left breast cancer diagnosed Feb 2018. Core biopsy      ER > 90 %  CT 0%  HER 2 IHC 2/3 +, CISH amplified   Tumor 4.2 cm on imaging MRI \par \par \par April 2018-July 2018  neoadjuvant chemotherapy TCHP x 6 cycles  followed by Herceptin alone \par Sept 2018- bilat mastectomies and bilat  [facility] ( Dr Urena)  : L breast residual 2.5 cm invasive ductal and lobular cancer grade 2 with DCIS , no LVI,  4 SLN negative R breast: atypical lobular hyperplasia, SLB neg x2     Bilat saline implants. \par \par Completed one year of herceptin/ Perjeta \par \par On hormonal therapy Letrozole since ~ August 2018 \par \par Jan 2020: CT chest follow up 2 mm LLL nodule- lung nodule no longer seen. Thyroid nodule seen. Thyroid sono 1/20/20 1.4 cm left thyroid nodule with microcal. biopsy recommended \par Bone scan- follow up on 3 mm focus left iliac bone \par \par Last visit with Dr Steen Jan 9, 2020 . DOing OK- continue Letrozole. \par \par MACIEJ - \par \par \par Thyroid nodule  on CT chest 1/2020.

## 2020-06-08 DIAGNOSIS — Z01.810 ENCOUNTER FOR PREPROCEDURAL CARDIOVASCULAR EXAMINATION: ICD-10-CM

## 2020-06-09 ENCOUNTER — RESULT REVIEW (OUTPATIENT)
Age: 60
End: 2020-06-09

## 2020-06-09 ENCOUNTER — APPOINTMENT (OUTPATIENT)
Dept: HEMATOLOGY ONCOLOGY | Facility: CLINIC | Age: 60
End: 2020-06-09
Payer: MEDICAID

## 2020-06-09 VITALS
BODY MASS INDEX: 28.53 KG/M2 | WEIGHT: 161 LBS | HEART RATE: 89 BPM | HEIGHT: 63 IN | SYSTOLIC BLOOD PRESSURE: 155 MMHG | DIASTOLIC BLOOD PRESSURE: 91 MMHG | TEMPERATURE: 97.3 F | RESPIRATION RATE: 16 BRPM

## 2020-06-09 LAB
BASOPHILS # BLD AUTO: 0.01 K/UL — SIGNIFICANT CHANGE UP (ref 0–0.2)
BASOPHILS NFR BLD AUTO: 0.1 % — SIGNIFICANT CHANGE UP (ref 0–2)
EOSINOPHIL # BLD AUTO: 0.08 K/UL — SIGNIFICANT CHANGE UP (ref 0–0.5)
EOSINOPHIL NFR BLD AUTO: 0.8 % — SIGNIFICANT CHANGE UP (ref 0–6)
HCT VFR BLD CALC: 40.1 % — SIGNIFICANT CHANGE UP (ref 34.5–45)
HGB BLD-MCNC: 13 G/DL — SIGNIFICANT CHANGE UP (ref 11.5–15.5)
IMM GRANULOCYTES NFR BLD AUTO: 0.3 % — SIGNIFICANT CHANGE UP (ref 0–1.5)
LYMPHOCYTES # BLD AUTO: 2.34 K/UL — SIGNIFICANT CHANGE UP (ref 1–3.3)
LYMPHOCYTES # BLD AUTO: 23.7 % — SIGNIFICANT CHANGE UP (ref 13–44)
MCHC RBC-ENTMCNC: 30.5 PG — SIGNIFICANT CHANGE UP (ref 27–34)
MCHC RBC-ENTMCNC: 32.4 GM/DL — SIGNIFICANT CHANGE UP (ref 32–36)
MCV RBC AUTO: 94.1 FL — SIGNIFICANT CHANGE UP (ref 80–100)
MONOCYTES # BLD AUTO: 0.51 K/UL — SIGNIFICANT CHANGE UP (ref 0–0.9)
MONOCYTES NFR BLD AUTO: 5.2 % — SIGNIFICANT CHANGE UP (ref 2–14)
NEUTROPHILS # BLD AUTO: 6.9 K/UL — SIGNIFICANT CHANGE UP (ref 1.8–7.4)
NEUTROPHILS NFR BLD AUTO: 69.9 % — SIGNIFICANT CHANGE UP (ref 43–77)
NRBC # BLD: 0 /100 WBCS — SIGNIFICANT CHANGE UP (ref 0–0)
PLATELET # BLD AUTO: 194 K/UL — SIGNIFICANT CHANGE UP (ref 150–400)
RBC # BLD: 4.26 M/UL — SIGNIFICANT CHANGE UP (ref 3.8–5.2)
RBC # FLD: 12.7 % — SIGNIFICANT CHANGE UP (ref 10.3–14.5)
WBC # BLD: 9.87 K/UL — SIGNIFICANT CHANGE UP (ref 3.8–10.5)
WBC # FLD AUTO: 9.87 K/UL — SIGNIFICANT CHANGE UP (ref 3.8–10.5)

## 2020-06-09 PROCEDURE — 99205 OFFICE O/P NEW HI 60 MIN: CPT

## 2020-06-09 RX ORDER — FUROSEMIDE 20 MG/1
20 TABLET ORAL
Qty: 90 | Refills: 1 | Status: DISCONTINUED | COMMUNITY
Start: 2019-05-29 | End: 2020-06-09

## 2020-06-09 NOTE — REVIEW OF SYSTEMS
[Patient Intake Form Reviewed] : Patient intake form was reviewed [Negative] : Heme/Lymph [FreeTextEntry7] : per HPI

## 2020-06-09 NOTE — HISTORY OF PRESENT ILLNESS
[Disease: _____________________] : Disease: [unfilled] [T: ___] : T[unfilled] [N: ___] : N[unfilled] [Treatment Protocol] : Treatment Protocol [de-identified] : ER > 90 %  MD 0%  HER2 +2/3  CISH amplified  [de-identified] : Patient of Dr Nicola Steen- wants to transfer  care here due to insurance change.\par \par Left breast cancer diagnosed Feb 2018. Core biopsy showed invasive ductal cancer    ER > 90 %  PA 0%  HER 2 IHC 2/3 +, CISH amplified   Tumor 4.2 cm on imaging MRI , nodes negative.\par April 2018-July 2018  neoadjuvant chemotherapy TCHP x 6 cycles . \par Sept 2018- bilat mastectomies and bilat  [facility] ( Dr Urena)  : L breast residual 2.5 cm invasive ductal and lobular cancer grade 2 with DCIS , no LVI,  4 SLN negative R breast: atypical lobular hyperplasia, SLB neg x2     Bilat saline implants. \par \par Completed one year of herceptin/ Perjeta in ~ April 2019. \par \par On hormonal therapy Letrozole since ~ August 2018 .\par \par Jan 2020: CT chest follow up 2 mm LLL nodule- lung nodule no longer seen. Thyroid nodule seen. Thyroid sono 1/20/20 1.4 cm left thyroid nodule with microcal. Thyroid biopsy Feb 2020 benign. \par Bone scan- follow up on 3 mm focus left iliac bone \par \par Last visit with Dr Steen Jan 9, 2020 . DOing OK- continue Letrozole. \par \par History of MACIEJ - no longer on iron.  Colonoscopy within one year- told " inflammation" and recommended to follow up in 6 months.\par \par \par Tolerating Letrozole OK. Does not remember when she had bone density ( ? 2018). Not on vit D.\par C/o  GI upset x several weeks- loose BM after meals,  no abd pain but states - does not feel normal. Bowel pattern/ stool changed.  \par  [de-identified] : invasive ductal and lobular  grade 2  [FreeTextEntry1] : adjuvant Letrozole since August 2018

## 2020-06-09 NOTE — PHYSICAL EXAM
[Fully active, able to carry on all pre-disease performance without restriction] : Status 0 - Fully active, able to carry on all pre-disease performance without restriction [Normal] : affect appropriate [de-identified] : pleasant  female  [de-identified] : s/p bilateral mastectomies with implant, no masses, no axillary adenopathy [de-identified] : soft non tender

## 2020-06-09 NOTE — ASSESSMENT
[FreeTextEntry1] : 60 y/o female with history of invasive ductal cancer of left breast ER positive HER2 positive diagnosed in 2018.\par S/p neoadjuvant TCHP chemotherapy, bilateral mastectomies SNB  ypT2, ypN0 tumor, completion of one year of herceptin/perjeta,on adjuvant letrozole since August 2018. \par \par Tolerating letrozole well. Plan for 10 years of adjuvant hormonal therapy.\par Get baseline bone density. Check vit D level 9 not on supplement).\par \par GI symptoms and history of ? abnormal colonoscopy- needs to follow with GI ( needs new GI due to insurance- gave patient names and phone numbers).\par \par If persistent GI symptoms she might also need CT abd pelvis .\par \par return visit in 4 months/ sooner PRN\par

## 2020-06-09 NOTE — CONSULT LETTER
[Dear  ___] : Dear  [unfilled], [Consult Letter:] : I had the pleasure of evaluating your patient, [unfilled]. [Please see my note below.] : Please see my note below. [Consult Closing:] : Thank you very much for allowing me to participate in the care of this patient.  If you have any questions, please do not hesitate to contact me. [Sincerely,] : Sincerely, [FreeTextEntry2] : Dr Sobeida Jones  [FreeTextEntry3] : Alana Shine

## 2020-06-09 NOTE — REASON FOR VISIT
[Initial Consultation] : an initial consultation [FreeTextEntry2] : breast cancer on adjuvant letrozole

## 2020-06-10 LAB
24R-OH-CALCIDIOL SERPL-MCNC: 26.1 NG/ML — LOW (ref 30–80)
ALBUMIN SERPL ELPH-MCNC: 4.7 G/DL — SIGNIFICANT CHANGE UP (ref 3.3–5)
ALP SERPL-CCNC: 114 U/L — SIGNIFICANT CHANGE UP (ref 40–120)
ALT FLD-CCNC: 14 U/L — SIGNIFICANT CHANGE UP (ref 10–45)
ANION GAP SERPL CALC-SCNC: 14 MMOL/L — SIGNIFICANT CHANGE UP (ref 5–17)
AST SERPL-CCNC: 21 U/L — SIGNIFICANT CHANGE UP (ref 10–40)
BILIRUB SERPL-MCNC: 0.4 MG/DL — SIGNIFICANT CHANGE UP (ref 0.2–1.2)
BUN SERPL-MCNC: 13 MG/DL — SIGNIFICANT CHANGE UP (ref 7–23)
CALCIUM SERPL-MCNC: 9.9 MG/DL — SIGNIFICANT CHANGE UP (ref 8.4–10.5)
CHLORIDE SERPL-SCNC: 104 MMOL/L — SIGNIFICANT CHANGE UP (ref 96–108)
CO2 SERPL-SCNC: 29 MMOL/L — SIGNIFICANT CHANGE UP (ref 22–31)
CREAT SERPL-MCNC: 0.97 MG/DL — SIGNIFICANT CHANGE UP (ref 0.5–1.3)
FERRITIN SERPL-MCNC: 70 NG/ML — SIGNIFICANT CHANGE UP (ref 15–150)
FOLATE SERPL-MCNC: >20 NG/ML — SIGNIFICANT CHANGE UP
GLUCOSE SERPL-MCNC: 93 MG/DL — SIGNIFICANT CHANGE UP (ref 70–99)
IRON SATN MFR SERPL: 26 % — SIGNIFICANT CHANGE UP (ref 14–50)
IRON SATN MFR SERPL: 84 UG/DL — SIGNIFICANT CHANGE UP (ref 30–160)
POTASSIUM SERPL-MCNC: 4.2 MMOL/L — SIGNIFICANT CHANGE UP (ref 3.5–5.3)
POTASSIUM SERPL-SCNC: 4.2 MMOL/L — SIGNIFICANT CHANGE UP (ref 3.5–5.3)
PROT SERPL-MCNC: 7.3 G/DL — SIGNIFICANT CHANGE UP (ref 6–8.3)
SODIUM SERPL-SCNC: 146 MMOL/L — HIGH (ref 135–145)
TIBC SERPL-MCNC: 318 UG/DL — SIGNIFICANT CHANGE UP (ref 220–430)
UIBC SERPL-MCNC: 234 UG/DL — SIGNIFICANT CHANGE UP (ref 110–370)
VIT B12 SERPL-MCNC: 426 PG/ML — SIGNIFICANT CHANGE UP (ref 232–1245)

## 2020-06-10 RX ORDER — LETROZOLE TABLETS 2.5 MG/1
2.5 TABLET, FILM COATED ORAL DAILY
Refills: 0 | Status: DISCONTINUED | COMMUNITY
End: 2020-06-10

## 2020-06-12 ENCOUNTER — RESULT REVIEW (OUTPATIENT)
Age: 60
End: 2020-06-12

## 2020-08-25 NOTE — PROGRESS NOTE ADULT - SUBJECTIVE AND OBJECTIVE BOX
[facility] Hematology/Oncology - Kecia Dhaliwal / [place] / [place] - [phone]  Primary Outpatient Hematologist/Oncologist:     Subjective  Patient seen and examined. Sitting in the chair, had one loose BM this morning, no blood init and no hematuria today.    Admitting Complaint/History  HPI:  57 f with breast cancer on docetaxel and carboplatin last dose Wednesday p/w hematuria - initially dark red a day before admission followed by an episode of profuse diarrhea then  pinkish colored urine all day on day of admission.    Overall pt states she has been feeling very weak and fatigued since chemo session 3 weeks ago and has developed extreme sensitivity of abdomen to light touch (clothing, etc) though no actual pain.  No f/c.  Poor appetite with poor taste for food.  Gagging on medications after chemo (takes a brief course of decadron).  + watery diarrhea surrounding chemo a few times a day with waxing/waning course each day.  No n/v.  + abdominal bloating.  Pt denies dysuria, urinary frequency, urinary hesitancy and urinary urgency.  LMP at 41 yo.      Of note pt states that while chemo was running on Wednesday she felt a brief episode of chest pain.  Has never had cp before.  Was told likely 2nd to chemo as resolved immediately after chemo infused.  Pt mother  of CHF.  Pt had a TTE within the last 6 months.  no sob/cough.  Pt states she has once more chemo session left and planned for mastectomy in August.    In ED received keflex 500mg po and then ctx 1 g with NS 2L. (2018 23:13)    the notes were read from the weekend and at this time I could not retreive my last notes for unknown reasons. The pt was clearly better and there was a marked decrease in the diarrhea over the weekend. She can eat and will have a colonoscopy in the am. I will discuss with Dr Steen about the next chemo session as it is likely most if not all the symptoms she had here are from the drugs for the breast cancer. 7/10 the pt was to be discharged yesterday but she developed a rash on her upper inner thighs. She is to see the dermatologist before discharge if possible and was placed on Mycostatin She felt well otherwise and the diarrhea had stopped.    Physical Exam:  General: AAO x 3. NAD.   HEENT: clear oropharynx, anicteric sclera, pink conjunctivae, EOMI  Cardiac: S1, S2 present. No audible murmurs. RRR  Lungs: CTA B/L.   Abdomen: Soft, Non-Tender, Non-Distended. No palpable hepatosplenomegaly  Extremities: No edema  Skin: No Rashes. No petechiae  Neuro: No focal motor or sensory deficits.   acetaminophen   Tablet. 650 milliGRAM(s) Oral every 6 hours PRN Mild Pain (1 - 3)  loperamide Solution 2 milliGRAM(s) Oral four times a day PRN Diarrhea                              9.0    7.40  )-----------( 85       ( 2018 09:24 )             26.9   Basic Metabolic Panel in AM (18 @ 07:07)    Sodium, Serum: 140 mmol/L    Potassium, Serum: 3.2 mmol/L    Chloride, Serum: 102 mmol/L    Carbon Dioxide, Serum: 27 mmol/L    Anion Gap, Serum: 11 mmol/L    Blood Urea Nitrogen, Serum: 12 mg/dL    Creatinine, Serum: 0.79 mg/dL    Glucose, Serum: 107 mg/dL    Calcium, Total Serum: 8.5 mg/dL   MEDICATIONS  (STANDING):  sodium chloride 0.9% 1000 milliLiter(s) (100 mL/Hr) IV Continuous <Continuous>    Vital Signs Last 24 Hrs  T(C): 36.6 (10 Jul 2018 07:30), Max: 36.8 (2018 15:37)  T(F): 97.9 (10 Jul 2018 07:30), Max: 98.2 (2018 15:37)  HR: 87 (10 Jul 2018 07:30) (79 - 99)  BP: 102/61 (10 Jul 2018 07:30) (102/61 - 112/61)  BP(mean): --  RR: 18 (10 Jul 2018 07:30) (18 - 20)  SpO2: 100% (10 Jul 2018 07:30) (99% - 100%)  pe pt looked better and was sitting in the chair and was not in distress mouth clear heart rr abdomen soft extremities' no edema. the rash that was on the inner thighs was flat pruritic dark in color and might be fungal or local irritation .   MEDICATIONS  (STANDING):  sodium chloride 0.9% 1000 milliLiter(s) (100 mL/Hr) IV Continuous <Continuous>    MEDICATIONS  (PRN):  acetaminophen   Tablet. 650 milliGRAM(s) Oral every 6 hours PRN Mild Pain (1 - 3)  MEDICATIONS  (STANDING):  sodium chloride 0.9% 1000 milliLiter(s) (100 mL/Hr) IV Continuous <Continuous>    MEDICATIONS  (PRN):  acetaminophen   Tablet. 650 milliGRAM(s) Oral every 6 hours PRN Mild Pain (1 - 3)  loperamide Solution 2 milliGRAM(s) Oral four times a day PRN Diarrhea  loperamide Solution 2 milliGRAM(s) Oral four times a day PRN Diarrhea Rhomboid Transposition Flap Text: The defect edges were debeveled with a #15 scalpel blade.  Given the location of the defect and the proximity to free margins a rhomboid transposition flap was deemed most appropriate.  Using a sterile surgical marker, an appropriate rhomboid flap was drawn incorporating the defect.    The area thus outlined was incised deep to adipose tissue with a #15 scalpel blade.  The skin margins were undermined to an appropriate distance in all directions utilizing iris scissors.

## 2020-10-07 ENCOUNTER — OUTPATIENT (OUTPATIENT)
Dept: OUTPATIENT SERVICES | Facility: HOSPITAL | Age: 60
LOS: 1 days | Discharge: HOME | End: 2020-10-07

## 2020-10-07 DIAGNOSIS — Z90.13 ACQUIRED ABSENCE OF BILATERAL BREASTS AND NIPPLES: Chronic | ICD-10-CM

## 2020-10-07 DIAGNOSIS — Z98.890 OTHER SPECIFIED POSTPROCEDURAL STATES: Chronic | ICD-10-CM

## 2020-10-07 DIAGNOSIS — C50.912 MALIGNANT NEOPLASM OF UNSPECIFIED SITE OF LEFT FEMALE BREAST: ICD-10-CM

## 2020-10-07 DIAGNOSIS — Z90.710 ACQUIRED ABSENCE OF BOTH CERVIX AND UTERUS: Chronic | ICD-10-CM

## 2020-10-07 DIAGNOSIS — Z98.82 BREAST IMPLANT STATUS: Chronic | ICD-10-CM

## 2020-10-07 DIAGNOSIS — Z98.891 HISTORY OF UTERINE SCAR FROM PREVIOUS SURGERY: Chronic | ICD-10-CM

## 2020-10-12 ENCOUNTER — APPOINTMENT (OUTPATIENT)
Dept: HEMATOLOGY ONCOLOGY | Facility: CLINIC | Age: 60
End: 2020-10-12
Payer: MEDICAID

## 2020-10-12 VITALS
RESPIRATION RATE: 16 BRPM | TEMPERATURE: 97.2 F | DIASTOLIC BLOOD PRESSURE: 74 MMHG | SYSTOLIC BLOOD PRESSURE: 129 MMHG | WEIGHT: 161 LBS | HEIGHT: 63 IN | HEART RATE: 75 BPM | BODY MASS INDEX: 28.53 KG/M2

## 2020-10-12 DIAGNOSIS — R79.89 OTHER SPECIFIED ABNORMAL FINDINGS OF BLOOD CHEMISTRY: ICD-10-CM

## 2020-10-12 PROCEDURE — 99214 OFFICE O/P EST MOD 30 MIN: CPT

## 2020-10-12 RX ORDER — MONTELUKAST 10 MG/1
10 TABLET, FILM COATED ORAL
Qty: 30 | Refills: 0 | Status: DISCONTINUED | COMMUNITY
Start: 2019-06-25 | End: 2020-10-12

## 2020-10-12 NOTE — REVIEW OF SYSTEMS
[Patient Intake Form Reviewed] : Patient intake form was reviewed [Negative] : Gastrointestinal [FreeTextEntry9] : per HPI

## 2020-10-12 NOTE — PHYSICAL EXAM
[Fully active, able to carry on all pre-disease performance without restriction] : Status 0 - Fully active, able to carry on all pre-disease performance without restriction [Normal] : affect appropriate [de-identified] : pleasant  female  [de-identified] : s/p bilateral mastectomies with implant, no masses, no axillary adenopathy [de-identified] : soft non tender

## 2020-10-12 NOTE — HISTORY OF PRESENT ILLNESS
[Disease: _____________________] : Disease: [unfilled] [T: ___] : T[unfilled] [N: ___] : N[unfilled] [Treatment Protocol] : Treatment Protocol [de-identified] : Patient of Dr Nicola Steen- wants to transfer  care here due to insurance change.\par \par Left breast cancer diagnosed Feb 2018. Core biopsy showed invasive ductal cancer    ER > 90 %  CA 0%  HER 2 IHC 2/3 +, CISH amplified   Tumor 4.2 cm on imaging MRI , nodes negative.\par April 2018-July 2018  neoadjuvant chemotherapy TCHP x 6 cycles . \par Sept 2018- bilat mastectomies and bilat  [facility] ( Dr Urena)  : L breast residual 2.5 cm invasive ductal and lobular cancer grade 2 with DCIS , no LVI,  4 SLN negative R breast: atypical lobular hyperplasia, SLB neg x2     Bilat saline implants. \par \par Completed one year of herceptin/ Perjeta in ~ April 2019. \par \par On hormonal therapy Letrozole since ~ August 2018 .\par \par Jan 2020: CT chest follow up 2 mm LLL nodule- lung nodule no longer seen. Thyroid nodule seen. Thyroid sono 1/20/20 1.4 cm left thyroid nodule with microcal. Thyroid biopsy Feb 2020 benign. \par Bone scan- follow up on 3 mm focus left iliac bone \par \par Last visit with Dr Steen Jan 9, 2020 . DOing OK- continue Letrozole. \par \par History of MACIEJ - no longer on iron.  Colonoscopy within one year- told " inflammation" and recommended to follow up in 6 months.\par \par \par  [de-identified] : invasive ductal and lobular  grade 2  [de-identified] : ER > 90 %  VA 0%  HER2 +2/3  CISH amplified  [FreeTextEntry1] : adjuvant Letrozole since August 2018  [de-identified] : Tolerating letrozole. minor musculoskeletal issues- stable. \par Vit D was borderline low - started  MVI but not able to swallow vit D pills.\par Not sure if she had bone density- does not remember- no results available.\par Had GI complaints last visit- much better now.  Watches her diet.

## 2020-10-12 NOTE — ASSESSMENT
[FreeTextEntry1] : 61 y/o female with history of invasive ductal cancer of left breast ER positive HER2 positive diagnosed in 2018.\par S/p neoadjuvant TCHP chemotherapy, bilateral mastectomies SNB  ypT2, ypN0 tumor, completion of one year of herceptin/perjeta,on adjuvant letrozole since August 2018. \par \par Tolerating letrozole well. Plan for 10 years of adjuvant hormonal therapy.\par Needs to get baseline bone density. \par \par she will start  vit D 1000 units daily- chewable.\par \par Routine follow up with GI - told that needs follow up colonoscopy.\par \par return visit in 4 months

## 2021-02-06 ENCOUNTER — OUTPATIENT (OUTPATIENT)
Dept: OUTPATIENT SERVICES | Facility: HOSPITAL | Age: 61
LOS: 1 days | Discharge: HOME | End: 2021-02-06

## 2021-02-06 DIAGNOSIS — Z98.890 OTHER SPECIFIED POSTPROCEDURAL STATES: Chronic | ICD-10-CM

## 2021-02-06 DIAGNOSIS — Z98.82 BREAST IMPLANT STATUS: Chronic | ICD-10-CM

## 2021-02-06 DIAGNOSIS — C50.912 MALIGNANT NEOPLASM OF UNSPECIFIED SITE OF LEFT FEMALE BREAST: ICD-10-CM

## 2021-02-06 DIAGNOSIS — Z90.13 ACQUIRED ABSENCE OF BILATERAL BREASTS AND NIPPLES: Chronic | ICD-10-CM

## 2021-02-06 DIAGNOSIS — Z98.891 HISTORY OF UTERINE SCAR FROM PREVIOUS SURGERY: Chronic | ICD-10-CM

## 2021-02-06 DIAGNOSIS — Z90.710 ACQUIRED ABSENCE OF BOTH CERVIX AND UTERUS: Chronic | ICD-10-CM

## 2021-02-12 ENCOUNTER — RESULT REVIEW (OUTPATIENT)
Age: 61
End: 2021-02-12

## 2021-02-12 ENCOUNTER — APPOINTMENT (OUTPATIENT)
Dept: HEMATOLOGY ONCOLOGY | Facility: CLINIC | Age: 61
End: 2021-02-12
Payer: MEDICAID

## 2021-02-12 VITALS
BODY MASS INDEX: 29.59 KG/M2 | DIASTOLIC BLOOD PRESSURE: 79 MMHG | RESPIRATION RATE: 16 BRPM | HEART RATE: 80 BPM | SYSTOLIC BLOOD PRESSURE: 157 MMHG | HEIGHT: 63 IN | TEMPERATURE: 97 F | WEIGHT: 167 LBS

## 2021-02-12 PROCEDURE — 99214 OFFICE O/P EST MOD 30 MIN: CPT

## 2021-02-12 NOTE — PHYSICAL EXAM
[Normal] : affect appropriate [de-identified] : bilat mastectomies with implant , dry skin with some hyperpigmentation no axillary adenopathy

## 2021-02-12 NOTE — REVIEW OF SYSTEMS
[Patient Intake Form Reviewed] : Patient intake form was reviewed [Negative] : Heme/Lymph [FreeTextEntry9] : per HPI

## 2021-02-12 NOTE — ASSESSMENT
[FreeTextEntry1] : 59 y/o female with history of invasive ductal cancer of left breast ER positive HER2 positive diagnosed in 2018.\par S/p neoadjuvant TCHP chemotherapy, bilateral mastectomies SNB  ypT2, ypN0 tumor, completion of one year of herceptin/perjeta,on adjuvant letrozole since August 2018. \par \par Plan for 10 years of adjuvant hormonal therapy.\par \par Not tolerating letrozole ( arthralgias) . Plan : stop x 3 weeks- if improved - try anastrozole. If no change- get bone scan.\par \par Needs to get baseline bone density. \par \par Check vit D level ( ? dose of vit D she is taking now).\par \par Routine follow up with GI - told that needs follow up colonoscopy.\par \par return visit in 4 months

## 2021-02-12 NOTE — HISTORY OF PRESENT ILLNESS
[Disease: _____________________] : Disease: [unfilled] [T: ___] : T[unfilled] [N: ___] : N[unfilled] [Treatment Protocol] : Treatment Protocol [de-identified] : Patient of Dr Nicola Steen- wants to transfer  care here due to insurance change.\par \par Left breast cancer diagnosed Feb 2018. Core biopsy showed invasive ductal cancer    ER > 90 %  WA 0%  HER 2 IHC 2/3 +, CISH amplified   Tumor 4.2 cm on imaging MRI , nodes negative.\par April 2018-July 2018  neoadjuvant chemotherapy TCHP x 6 cycles . \par Sept 2018- bilat mastectomies and bilat  [facility] ( Dr Urena)  : L breast residual 2.5 cm invasive ductal and lobular cancer grade 2 with DCIS , no LVI,  4 SLN negative R breast: atypical lobular hyperplasia, SLB neg x2     Bilat saline implants. \par \par Completed one year of herceptin/ Perjeta in ~ April 2019. \par \par On hormonal therapy Letrozole since ~ August 2018 .\par \par Jan 2020: CT chest follow up 2 mm LLL nodule- lung nodule no longer seen. Thyroid nodule seen. Thyroid sono 1/20/20 1.4 cm left thyroid nodule with microcal. Thyroid biopsy Feb 2020 benign. \par Bone scan- follow up on 3 mm focus left iliac bone \par \par Last visit with Dr Steen Jan 9, 2020 . DOing OK- continue Letrozole. \par \par History of MACIEJ - no longer on iron.  Colonoscopy within one year- told " inflammation" and recommended to follow up in 6 months.\par \par \par  [de-identified] : invasive ductal and lobular  grade 2  [de-identified] : ER > 90 %  AL 0%  HER2 +2/3  CISH amplified  [FreeTextEntry1] : adjuvant Letrozole since August 2018  [de-identified] : C/o aches all over since on letrozole. because of aches- she does not take it every day.\par Did not go for bone density.\par Takes vit D but does not know dose.

## 2021-02-16 ENCOUNTER — NON-APPOINTMENT (OUTPATIENT)
Age: 61
End: 2021-02-16

## 2021-02-16 RX ORDER — KETOCONAZOLE 20 MG/G
2 CREAM TOPICAL
Qty: 90 | Refills: 0 | Status: DISCONTINUED | COMMUNITY
Start: 2021-01-25

## 2021-03-05 ENCOUNTER — NON-APPOINTMENT (OUTPATIENT)
Age: 61
End: 2021-03-05

## 2021-03-05 RX ORDER — LETROZOLE TABLETS 2.5 MG/1
2.5 TABLET, FILM COATED ORAL
Qty: 90 | Refills: 3 | Status: DISCONTINUED | COMMUNITY
Start: 2020-06-09 | End: 2021-03-05

## 2021-04-13 DIAGNOSIS — Z82.49 FAMILY HISTORY OF ISCHEMIC HEART DISEASE AND OTHER DISEASES OF THE CIRCULATORY SYSTEM: ICD-10-CM

## 2021-04-13 DIAGNOSIS — Z80.0 FAMILY HISTORY OF MALIGNANT NEOPLASM OF DIGESTIVE ORGANS: ICD-10-CM

## 2021-04-13 DIAGNOSIS — Z82.61 FAMILY HISTORY OF ARTHRITIS: ICD-10-CM

## 2021-04-13 DIAGNOSIS — Z81.8 FAMILY HISTORY OF OTHER MENTAL AND BEHAVIORAL DISORDERS: ICD-10-CM

## 2021-04-13 DIAGNOSIS — Z80.41 FAMILY HISTORY OF MALIGNANT NEOPLASM OF OVARY: ICD-10-CM

## 2021-04-13 DIAGNOSIS — K29.80 DUODENITIS W/OUT BLEEDING: ICD-10-CM

## 2021-04-14 ENCOUNTER — APPOINTMENT (OUTPATIENT)
Dept: INTERNAL MEDICINE | Facility: CLINIC | Age: 61
End: 2021-04-14

## 2021-05-12 ENCOUNTER — APPOINTMENT (OUTPATIENT)
Dept: INTERNAL MEDICINE | Facility: CLINIC | Age: 61
End: 2021-05-12

## 2021-06-02 ENCOUNTER — APPOINTMENT (OUTPATIENT)
Dept: ORTHOPEDIC SURGERY | Facility: CLINIC | Age: 61
End: 2021-06-02
Payer: MEDICAID

## 2021-06-02 VITALS
BODY MASS INDEX: 29.59 KG/M2 | HEART RATE: 93 BPM | HEIGHT: 63 IN | SYSTOLIC BLOOD PRESSURE: 129 MMHG | DIASTOLIC BLOOD PRESSURE: 81 MMHG | WEIGHT: 167 LBS

## 2021-06-02 PROCEDURE — 99204 OFFICE O/P NEW MOD 45 MIN: CPT

## 2021-06-02 PROCEDURE — 73110 X-RAY EXAM OF WRIST: CPT | Mod: LT

## 2021-06-02 PROCEDURE — 99072 ADDL SUPL MATRL&STAF TM PHE: CPT

## 2021-06-10 ENCOUNTER — NON-APPOINTMENT (OUTPATIENT)
Age: 61
End: 2021-06-10

## 2021-06-15 ENCOUNTER — APPOINTMENT (OUTPATIENT)
Dept: INTERNAL MEDICINE | Facility: CLINIC | Age: 61
End: 2021-06-15
Payer: MEDICAID

## 2021-06-15 ENCOUNTER — OUTPATIENT (OUTPATIENT)
Dept: OUTPATIENT SERVICES | Facility: HOSPITAL | Age: 61
LOS: 1 days | Discharge: HOME | End: 2021-06-15

## 2021-06-15 VITALS
BODY MASS INDEX: 30.48 KG/M2 | HEART RATE: 81 BPM | DIASTOLIC BLOOD PRESSURE: 86 MMHG | WEIGHT: 172 LBS | TEMPERATURE: 97 F | HEIGHT: 63 IN | SYSTOLIC BLOOD PRESSURE: 146 MMHG | OXYGEN SATURATION: 99 %

## 2021-06-15 VITALS — SYSTOLIC BLOOD PRESSURE: 132 MMHG | DIASTOLIC BLOOD PRESSURE: 72 MMHG

## 2021-06-15 DIAGNOSIS — Z90.710 ACQUIRED ABSENCE OF BOTH CERVIX AND UTERUS: Chronic | ICD-10-CM

## 2021-06-15 DIAGNOSIS — G43.909 MIGRAINE, UNSPECIFIED, NOT INTRACTABLE, W/OUT STATUS MIGRAINOSUS: ICD-10-CM

## 2021-06-15 DIAGNOSIS — C50.912 MALIGNANT NEOPLASM OF UNSPECIFIED SITE OF LEFT FEMALE BREAST: ICD-10-CM

## 2021-06-15 DIAGNOSIS — Z98.890 OTHER SPECIFIED POSTPROCEDURAL STATES: Chronic | ICD-10-CM

## 2021-06-15 DIAGNOSIS — H91.90 UNSPECIFIED HEARING LOSS, UNSPECIFIED EAR: ICD-10-CM

## 2021-06-15 DIAGNOSIS — Z80.3 FAMILY HISTORY OF MALIGNANT NEOPLASM OF BREAST: ICD-10-CM

## 2021-06-15 DIAGNOSIS — Z90.13 ACQUIRED ABSENCE OF BILATERAL BREASTS AND NIPPLES: Chronic | ICD-10-CM

## 2021-06-15 DIAGNOSIS — Z98.82 BREAST IMPLANT STATUS: Chronic | ICD-10-CM

## 2021-06-15 DIAGNOSIS — Z98.891 HISTORY OF UTERINE SCAR FROM PREVIOUS SURGERY: Chronic | ICD-10-CM

## 2021-06-15 DIAGNOSIS — R60.0 LOCALIZED EDEMA: ICD-10-CM

## 2021-06-15 PROCEDURE — 99396 PREV VISIT EST AGE 40-64: CPT

## 2021-06-15 PROCEDURE — 99072 ADDL SUPL MATRL&STAF TM PHE: CPT

## 2021-06-15 PROCEDURE — 99386 PREV VISIT NEW AGE 40-64: CPT

## 2021-06-15 RX ORDER — CHOLECALCIFEROL (VITAMIN D3) 50 MCG
50 MCG TABLET ORAL
Refills: 0 | Status: DISCONTINUED | COMMUNITY
Start: 2021-02-16 | End: 2021-06-15

## 2021-06-15 RX ORDER — PANTOPRAZOLE 40 MG/1
40 TABLET, DELAYED RELEASE ORAL
Refills: 0 | Status: DISCONTINUED | COMMUNITY
Start: 2020-01-28 | End: 2021-06-15

## 2021-06-15 RX ORDER — MONTELUKAST SODIUM 10 MG/1
10 TABLET, FILM COATED ORAL
Refills: 0 | Status: DISCONTINUED | COMMUNITY
End: 2021-06-15

## 2021-06-15 NOTE — HEALTH RISK ASSESSMENT
[No] : In the past 12 months have you used drugs other than those required for medical reasons? No [0] : 2) Feeling down, depressed, or hopeless: Not at all (0) [30 or more] : 30 or more [Any fall with injury in past year] : Patient reported fall with injury in the past year [Alone] : lives alone [Fully functional (bathing, dressing, toileting, transferring, walking, feeding)] : Fully functional (bathing, dressing, toileting, transferring, walking, feeding) [Fully functional (using the telephone, shopping, preparing meals, housekeeping, doing laundry, using] : Fully functional and needs no help or supervision to perform IADLs (using the telephone, shopping, preparing meals, housekeeping, doing laundry, using transportation, managing medications and managing finances) [Seat Belt] :  uses seat belt [Sunscreen] : uses sunscreen [With Patient/Caregiver] : With Patient/Caregiver [Name: ___] : Health Care Proxy's Name: [unfilled]  [Relationship: ___] : Relationship: [unfilled] [] : No [de-identified] : dancing [de-identified] : no [[id number]] : 0 [EyeExamDate] : 2019 [Change in mental status noted] : No change in mental status noted [Language] : denies difficulty with language [MammogramDate] : 2019 [ColonoscopyDate] : 2019 [AdvancecareDate] : 6/15/21

## 2021-06-15 NOTE — PHYSICAL EXAM
[No Acute Distress] : no acute distress [Well Nourished] : well nourished [Well Developed] : well developed [Well-Appearing] : well-appearing [Normal Sclera/Conjunctiva] : normal sclera/conjunctiva [PERRL] : pupils equal round and reactive to light [EOMI] : extraocular movements intact [Normal Outer Ear/Nose] : the outer ears and nose were normal in appearance [Normal Oropharynx] : the oropharynx was normal [No JVD] : no jugular venous distention [No Lymphadenopathy] : no lymphadenopathy [Supple] : supple [Thyroid Normal, No Nodules] : the thyroid was normal and there were no nodules present [No Respiratory Distress] : no respiratory distress  [No Accessory Muscle Use] : no accessory muscle use [Clear to Auscultation] : lungs were clear to auscultation bilaterally [Normal Rate] : normal rate  [Regular Rhythm] : with a regular rhythm [Normal S1, S2] : normal S1 and S2 [No Murmur] : no murmur heard [No Carotid Bruits] : no carotid bruits [No Abdominal Bruit] : a ~M bruit was not heard ~T in the abdomen [No Varicosities] : no varicosities [Pedal Pulses Present] : the pedal pulses are present [No Edema] : there was no peripheral edema [No Palpable Aorta] : no palpable aorta [No Extremity Clubbing/Cyanosis] : no extremity clubbing/cyanosis [Normal Appearance] : normal in appearance [No Nipple Discharge] : no nipple discharge [No Axillary Lymphadenopathy] : no axillary lymphadenopathy [Soft] : abdomen soft [Non Tender] : non-tender [Non-distended] : non-distended [No Masses] : no abdominal mass palpated [No HSM] : no HSM [Normal Bowel Sounds] : normal bowel sounds [Normal Sphincter Tone] : normal sphincter tone [No Mass] : no mass [Normal Posterior Cervical Nodes] : no posterior cervical lymphadenopathy [Normal Anterior Cervical Nodes] : no anterior cervical lymphadenopathy [No CVA Tenderness] : no CVA  tenderness [No Spinal Tenderness] : no spinal tenderness [No Joint Swelling] : no joint swelling [Grossly Normal Strength/Tone] : grossly normal strength/tone [No Rash] : no rash [Coordination Grossly Intact] : coordination grossly intact [No Focal Deficits] : no focal deficits [Normal Gait] : normal gait [Deep Tendon Reflexes (DTR)] : deep tendon reflexes were 2+ and symmetric [Normal Affect] : the affect was normal [Normal Insight/Judgement] : insight and judgment were intact [Stool Occult Blood] : stool negative for occult blood

## 2021-06-16 ENCOUNTER — APPOINTMENT (OUTPATIENT)
Dept: HEMATOLOGY ONCOLOGY | Facility: CLINIC | Age: 61
End: 2021-06-16

## 2021-06-17 ENCOUNTER — APPOINTMENT (OUTPATIENT)
Dept: ORTHOPEDIC SURGERY | Facility: CLINIC | Age: 61
End: 2021-06-17
Payer: MEDICAID

## 2021-06-17 VITALS
HEIGHT: 63 IN | HEART RATE: 77 BPM | WEIGHT: 172 LBS | BODY MASS INDEX: 30.48 KG/M2 | DIASTOLIC BLOOD PRESSURE: 79 MMHG | SYSTOLIC BLOOD PRESSURE: 150 MMHG

## 2021-06-17 PROCEDURE — 99213 OFFICE O/P EST LOW 20 MIN: CPT

## 2021-06-17 PROCEDURE — 73110 X-RAY EXAM OF WRIST: CPT | Mod: LT

## 2021-06-17 PROCEDURE — 99072 ADDL SUPL MATRL&STAF TM PHE: CPT

## 2021-07-12 ENCOUNTER — APPOINTMENT (OUTPATIENT)
Dept: HEMATOLOGY ONCOLOGY | Facility: CLINIC | Age: 61
End: 2021-07-12
Payer: MEDICAID

## 2021-07-12 DIAGNOSIS — Z86.2 PERSONAL HISTORY OF DISEASES OF THE BLOOD AND BLOOD-FORMING ORGANS AND CERTAIN DISORDERS INVOLVING THE IMMUNE MECHANISM: ICD-10-CM

## 2021-07-12 NOTE — HISTORY OF PRESENT ILLNESS
[Disease: _____________________] : Disease: [unfilled] [T: ___] : T[unfilled] [N: ___] : N[unfilled] [de-identified] : Patient of Dr Nicola Steen- wants to transfer  care here due to insurance change.\par \par Left breast cancer diagnosed Feb 2018. Core biopsy showed invasive ductal cancer    ER > 90 %  RI 0%  HER 2 IHC 2/3 +, CISH amplified   Tumor 4.2 cm on imaging MRI , nodes negative.\par April 2018-July 2018  neoadjuvant chemotherapy TCHP x 6 cycles . \par Sept 2018- bilat mastectomies and bilat  [facility] ( Dr Urena)  : L breast residual 2.5 cm invasive ductal and lobular cancer grade 2 with DCIS , no LVI,  4 SLN negative R breast: atypical lobular hyperplasia, SLB neg x2     Bilat saline implants. \par \par Completed one year of herceptin/ Perjeta in ~ April 2019. \par \par On hormonal therapy Letrozole since ~ August 2018 .\par \par Jan 2020: CT chest follow up 2 mm LLL nodule- lung nodule no longer seen. Thyroid nodule seen. Thyroid sono 1/20/20 1.4 cm left thyroid nodule with microcal. Thyroid biopsy Feb 2020 benign. \par Bone scan- follow up on 3 mm focus left iliac bone \par \par Last visit with Dr Steen Jan 9, 2020 . DOing OK- continue Letrozole. \par \par History of MACIEJ - no longer on iron.  Colonoscopy within one year- told " inflammation" and recommended to follow up in 6 months.\par \par \par  [de-identified] : invasive ductal and lobular  grade 2  [de-identified] : ER > 90 %  OK 0%  HER2 +2/3  CISH amplified  [de-identified] : C/o aches all over since on letrozole. because of aches- she does not take it every day.\par Did not go for bone density.\par Takes vit D but does not know dose.

## 2021-07-12 NOTE — PHYSICAL EXAM
[Normal] : affect appropriate [de-identified] : bilat mastectomies with implant , dry skin with some hyperpigmentation no axillary adenopathy

## 2021-07-12 NOTE — ASSESSMENT
[FreeTextEntry1] : 61 y/o female with history of invasive ductal cancer of left breast ER positive HER2 positive diagnosed in 2018.\par S/p neoadjuvant TCHP chemotherapy, bilateral mastectomies SN[facility]  ypT2, ypN0 tumor, completion of one year of herceptin/perjeta,on adjuvant letrozole since August 2018. \par \par Plan for 10 years of adjuvant hormonal therapy.\par \par Not tolerating letrozole ( arthralgias) . Plan : stop x 3 weeks- if improved - try anastrozole. If no change- get bone scan.\par \par Needs to get baseline bone density. \par \par Check vit D level ( ? dose of vit D she is taking now).\par \par Routine follow up with GI - told that needs follow up colonoscopy.\par \par return visit in 4 months

## 2021-07-29 ENCOUNTER — APPOINTMENT (OUTPATIENT)
Dept: ORTHOPEDIC SURGERY | Facility: CLINIC | Age: 61
End: 2021-07-29
Payer: MEDICAID

## 2021-07-29 VITALS
SYSTOLIC BLOOD PRESSURE: 169 MMHG | HEART RATE: 90 BPM | WEIGHT: 172 LBS | BODY MASS INDEX: 30.48 KG/M2 | HEIGHT: 63 IN | DIASTOLIC BLOOD PRESSURE: 80 MMHG

## 2021-07-29 DIAGNOSIS — S52.572D OTHER INTRAARTICULAR FRACTURE OF LOWER END OF LEFT RADIUS, SUBSEQUENT ENCOUNTER FOR CLOSED FRACTURE WITH ROUTINE HEALING: ICD-10-CM

## 2021-07-29 PROCEDURE — 99213 OFFICE O/P EST LOW 20 MIN: CPT

## 2021-07-29 NOTE — PHYSICAL EXAM
[de-identified] : GENERAL: Awake, alert, cooperative, answers questions appropriately. No acute distress.  Ambulates independently with a normal gait.\par SKIN: Warm, dry, intact. Color and turgor normal. \par LUNGS: Demonstrates unlabored breathing on room air with no accessory muscle use.\par EXTREMITIES: Warm and well-perfused. \par NEUROLOGICAL: Grossly intact.\par \par FOCUSED UPPER EXTREMITY EXAM:\par \par [facility]:\par -skin clean and dry\par -mild swelling along dorsal wrist with resolving ecchymosis\par -normal finger cascade without malrotation\par -minimal tenderness to palpation over distal radius with subtle stepoff, no motion at the fracture site\par -minimal tenderness to palpation over ulnar styloid\par -no tenderness to palpation along first dorsal wrist compartment, thumb extension intact\par -no tenderness to palpation at the base of the thumb\par -able to make full fist, good finger AROM with minimal stiffness, no scissoring\par -decreased wrist ROM from stiffness, DRUJ stable\par -sensation intact in radial, ulnar, and median nerve distributions\par -Brisk capillary refill, fingers warm well perfused \par  [de-identified] : X-rays of left wrist (3 views) out of the splint were obtained in Office today and reviewed with patient, showing healing of the distal radius fracture with callus formation, no change in alignment compared to previous XRs. \par \par Previous X-rays of left wrist (3 views) in the splint were obtained in Office, showing mildly displaced intraarticular distal radius fracture with slight shortening and loss of radial height, ulnar positive by about 2 mm, about 4 degrees volar tilt with subtle callus formation

## 2021-07-29 NOTE — DISCUSSION/SUMMARY
[FreeTextEntry1] : 60 year old female with mildly displaced left distal radius fracture (DOI 5/15/2021), doing well\par \par -We discussed in detail the clinical and radiographic findings\par -We discussed the pathophysiology and natural history of patient's condition\par -I transitioned her into a fracture brace for night wear and strenuous activities for another 2 weeks before weaning off.  She should come out of the brace for wrist AROM and hygiene.\par -I referred her to therapy to work on ROM, pain control, edema control, modalities, home exercises.  WBAT after six weeks from the time of injury.  Strengthening can start after eight weeks from the time of injury.\par -She was advised to take over the counter medication for pain as needed if there is no contraindication. \par -She was advised to keep the UE elevated and use ice intermittently to help decrease swelling \par -I will see her in 6 weeks, sooner as needed\par -Patient had the opportunity to ask questions and was in agreement with the plan\par -Over 50% of the time spent with the patient was on counseling the patient on the above diagnosis, treatment plan and prognosis. \par

## 2021-07-29 NOTE — HISTORY OF PRESENT ILLNESS
[FreeTextEntry1] : 06/17/2021\par Ms. MAC HAMILTON returns for follow up of left wrist fracture (DOI 5/15/21).  She tolerated the splint well.  Reports improved pain, denies paresthesia.\par \par 06/02/2021\par Ms. MAC HAMILTON is a pleasant 60 year old female, RHD,  in between jobs.\par \par She presents to the office for evaluation of left wrist injury after a fall 5/15/21.  She was seen at outside emergency room where imaging showed distal radius fracture.  She was placed in a sugartong splint and referred to our office for follow up. \par \par She denies prior injury.\par Currently her pain is constant, achy, sharp at times, with radiation up the elbow.\par She denies paresthesia in the fingertips.\par She endorses night symptoms that wake her up.\par Symptoms improve with rest and immobilization.\par Symptoms worsen with activity.\par  \par She is not diabetic.\par She denies history of thyroid disease.\par She endorses nicotine use about quarter pack per day.\par She denies recreational drug use.\par She does not take any narcotic pain medication other than what was prescribed for the current injury.\par \par She is a breast CA survivor after bilateral mastectomies currently cancer free.

## 2021-08-02 NOTE — PHYSICAL EXAM
[de-identified] : GENERAL: Awake, alert, cooperative, answers questions appropriately. No acute distress.  Ambulates independently with a normal gait.\par SKIN: Warm, dry, intact. Color and turgor normal. \par LUNGS: Demonstrates unlabored breathing on room air with no accessory muscle use.\par EXTREMITIES: Warm and well-perfused. \par NEUROLOGICAL: Grossly intact.\par \par FOCUSED UPPER EXTREMITY EXAM:\par \par LUE:\par -skin clean and dry\par -very mild swelling along dorsal wrist with no apparent ecchymosis\par -normal finger cascade without malrotation\par -minimal tenderness to palpation over distal radius with subtle stepoff, no motion at the fracture site\par -minimal tenderness to palpation over ulnar styloid\par -no tenderness to palpation along first dorsal wrist compartment, thumb extension intact\par -no tenderness to palpation at the base of the thumb\par -able to make full fist, good finger AROM with minimal stiffness, no scissoring\par -slightly decreased wrist ROM from stiffness, DRUJ stable\par -sensation intact in radial, ulnar, and median nerve distributions\par -Brisk capillary refill, fingers warm well perfused \par  [de-identified] : No new XRs were taken during today's visit.\par \par Previous X-rays of left wrist (3 views) out of the splint were obtained in Office, showing healing of the distal radius fracture with callus formation, no change in alignment compared to previous XRs. \par \par Previous X-rays of left wrist (3 views) in the splint were obtained in Office, showing mildly displaced intraarticular distal radius fracture with slight shortening and loss of radial height, ulnar positive by about 2 mm, about 4 degrees volar tilt with subtle callus formation

## 2021-08-02 NOTE — DISCUSSION/SUMMARY
[FreeTextEntry1] : 60 year old female with mildly displaced left distal radius fracture (DOI 5/15/2021), overall doing well\par \par -We discussed in detail the clinical findings\par -We discussed the pathophysiology and natural history of patient's condition\par -I recommended to continue therapy for ROM and strengthening\par -She can gradually return to regular activities without restrictions, WBAT\par -She was advised to take over the counter medication for pain as needed if there is no contraindication. \par -I will see her in a month, sooner as needed\par -Patient had the opportunity to ask questions and was in agreement with the plan\par -Over 50% of the time spent with the patient was on counseling the patient on the above diagnosis, treatment plan and prognosis. \par

## 2021-08-02 NOTE — HISTORY OF PRESENT ILLNESS
[FreeTextEntry1] : 07/29/2021\par Ms. MAC HAMILTON returns for follow up of left wrist fracture (DOI 5/15/21).  She made some progress with therapy with improved pain and ROM, denies paresthesia.\par \par 06/17/2021\par Ms. MAC HAMILTON returns for follow up of left wrist fracture (DOI 5/15/21).  She tolerated the splint well.  Reports improved pain, denies paresthesia.\par \par 06/02/2021\par Ms. MAC HAMILTON is a pleasant 60 year old female, RHD,  in between jobs.\par \par She presents to the office for evaluation of left wrist injury after a fall 5/15/21.  She was seen at outside emergency room where imaging showed distal radius fracture.  She was placed in a sugartong splint and referred to our office for follow up. \par \par She denies prior injury.\par Currently her pain is constant, achy, sharp at times, with radiation up the elbow.\par She denies paresthesia in the fingertips.\par She endorses night symptoms that wake her up.\par Symptoms improve with rest and immobilization.\par Symptoms worsen with activity.\par  \par She is not diabetic.\par She denies history of thyroid disease.\par She endorses nicotine use about quarter pack per day.\par She denies recreational drug use.\par She does not take any narcotic pain medication other than what was prescribed for the current injury.\par \par She is a breast CA survivor after bilateral mastectomies currently cancer free.

## 2021-08-09 ENCOUNTER — OUTPATIENT (OUTPATIENT)
Dept: OUTPATIENT SERVICES | Facility: HOSPITAL | Age: 61
LOS: 1 days | Discharge: HOME | End: 2021-08-09

## 2021-08-09 DIAGNOSIS — C50.912 MALIGNANT NEOPLASM OF UNSPECIFIED SITE OF LEFT FEMALE BREAST: ICD-10-CM

## 2021-08-09 DIAGNOSIS — Z98.890 OTHER SPECIFIED POSTPROCEDURAL STATES: Chronic | ICD-10-CM

## 2021-08-09 DIAGNOSIS — Z90.710 ACQUIRED ABSENCE OF BOTH CERVIX AND UTERUS: Chronic | ICD-10-CM

## 2021-08-09 DIAGNOSIS — Z90.13 ACQUIRED ABSENCE OF BILATERAL BREASTS AND NIPPLES: Chronic | ICD-10-CM

## 2021-08-09 DIAGNOSIS — Z98.891 HISTORY OF UTERINE SCAR FROM PREVIOUS SURGERY: Chronic | ICD-10-CM

## 2021-08-09 DIAGNOSIS — Z98.82 BREAST IMPLANT STATUS: Chronic | ICD-10-CM

## 2021-08-10 ENCOUNTER — APPOINTMENT (OUTPATIENT)
Dept: HEMATOLOGY ONCOLOGY | Facility: CLINIC | Age: 61
End: 2021-08-10
Payer: MEDICAID

## 2021-08-10 ENCOUNTER — APPOINTMENT (OUTPATIENT)
Dept: HEMATOLOGY ONCOLOGY | Facility: CLINIC | Age: 61
End: 2021-08-10

## 2021-08-10 NOTE — HISTORY OF PRESENT ILLNESS
[Disease: _____________________] : Disease: [unfilled] [T: ___] : T[unfilled] [N: ___] : N[unfilled] [de-identified] : Patient of Dr Nicola Steen- wants to transfer  care here due to insurance change.\par \par Left breast cancer diagnosed Feb 2018. Core biopsy showed invasive ductal cancer    ER > 90 %  [place] 0%  HER 2 IHC 2/3 +, CISH amplified   Tumor 4.2 cm on imaging MRI , nodes negative.\par April 2018-July 2018  neoadjuvant chemotherapy TCHP x 6 cycles . \par Sept 2018- bilat mastectomies and bilat  SNB ( Dr Urena)  : L breast residual 2.5 cm invasive ductal and lobular cancer grade 2 with DCIS , no LVI,  4 SLN negative R breast: atypical lobular hyperplasia, SLB neg x2     Bilat saline implants. \par \par Completed one year of herceptin/ Perjeta in ~ April 2019. \par \par On hormonal therapy Letrozole since ~ August 2018 .\par \par Jan 2020: CT chest follow up 2 mm LLL nodule- lung nodule no longer seen. Thyroid nodule seen. Thyroid sono 1/20/20 1.4 cm left thyroid nodule with microcal. Thyroid biopsy Feb 2020 benign. \par Bone scan- follow up on 3 mm focus left iliac bone \par \par Last visit with Dr Steen Jan 9, 2020 . DOing OK- continue Letrozole. \par \par History of MACIEJ - no longer on iron.  Colonoscopy within one year- told " inflammation" and recommended to follow up in 6 months.\par \par \par  [de-identified] : invasive ductal and lobular  grade 2  [de-identified] : ER > 90 %  [place] 0%  HER2 +2/3  CISH amplified  [de-identified] : C/o aches all over since on letrozole. because of aches- she does not take it every day.\par Did not go for bone density.\par Takes vit D but does not know dose.

## 2021-08-10 NOTE — PHYSICAL EXAM
[Normal] : affect appropriate [de-identified] : bilat mastectomies with implant , dry skin with some hyperpigmentation no axillary adenopathy

## 2021-08-13 ENCOUNTER — RESULT REVIEW (OUTPATIENT)
Age: 61
End: 2021-08-13

## 2021-08-13 ENCOUNTER — APPOINTMENT (OUTPATIENT)
Dept: HEMATOLOGY ONCOLOGY | Facility: CLINIC | Age: 61
End: 2021-08-13
Payer: MEDICAID

## 2021-08-13 VITALS
HEART RATE: 82 BPM | SYSTOLIC BLOOD PRESSURE: 134 MMHG | BODY MASS INDEX: 31 KG/M2 | DIASTOLIC BLOOD PRESSURE: 79 MMHG | TEMPERATURE: 98 F | WEIGHT: 175 LBS

## 2021-08-13 DIAGNOSIS — E55.9 VITAMIN D DEFICIENCY, UNSPECIFIED: ICD-10-CM

## 2021-08-13 LAB
BASOPHILS # BLD AUTO: 0.02 K/UL — SIGNIFICANT CHANGE UP (ref 0–0.2)
BASOPHILS NFR BLD AUTO: 0.3 % — SIGNIFICANT CHANGE UP (ref 0–2)
EOSINOPHIL # BLD AUTO: 0.14 K/UL — SIGNIFICANT CHANGE UP (ref 0–0.5)
EOSINOPHIL NFR BLD AUTO: 1.8 % — SIGNIFICANT CHANGE UP (ref 0–6)
FERRITIN SERPL-MCNC: 39 NG/ML — SIGNIFICANT CHANGE UP (ref 15–150)
HCT VFR BLD CALC: 40 % — SIGNIFICANT CHANGE UP (ref 34.5–45)
HGB BLD-MCNC: 12.8 G/DL — SIGNIFICANT CHANGE UP (ref 11.5–15.5)
IMM GRANULOCYTES NFR BLD AUTO: 0.6 % — SIGNIFICANT CHANGE UP (ref 0–1.5)
IRON SATN MFR SERPL: 30 % — SIGNIFICANT CHANGE UP (ref 14–50)
IRON SATN MFR SERPL: 99 UG/DL — SIGNIFICANT CHANGE UP (ref 30–160)
LYMPHOCYTES # BLD AUTO: 2.2 K/UL — SIGNIFICANT CHANGE UP (ref 1–3.3)
LYMPHOCYTES # BLD AUTO: 28.6 % — SIGNIFICANT CHANGE UP (ref 13–44)
MCHC RBC-ENTMCNC: 29.8 PG — SIGNIFICANT CHANGE UP (ref 27–34)
MCHC RBC-ENTMCNC: 32 GM/DL — SIGNIFICANT CHANGE UP (ref 32–36)
MCV RBC AUTO: 93.2 FL — SIGNIFICANT CHANGE UP (ref 80–100)
MONOCYTES # BLD AUTO: 0.49 K/UL — SIGNIFICANT CHANGE UP (ref 0–0.9)
MONOCYTES NFR BLD AUTO: 6.4 % — SIGNIFICANT CHANGE UP (ref 2–14)
NEUTROPHILS # BLD AUTO: 4.8 K/UL — SIGNIFICANT CHANGE UP (ref 1.8–7.4)
NEUTROPHILS NFR BLD AUTO: 62.3 % — SIGNIFICANT CHANGE UP (ref 43–77)
NRBC # BLD: 0 /100 WBCS — SIGNIFICANT CHANGE UP (ref 0–0)
PLATELET # BLD AUTO: 208 K/UL — SIGNIFICANT CHANGE UP (ref 150–400)
RBC # BLD: 4.29 M/UL — SIGNIFICANT CHANGE UP (ref 3.8–5.2)
RBC # FLD: 13.1 % — SIGNIFICANT CHANGE UP (ref 10.3–14.5)
TIBC SERPL-MCNC: 324 UG/DL — SIGNIFICANT CHANGE UP (ref 220–430)
UIBC SERPL-MCNC: 226 UG/DL — SIGNIFICANT CHANGE UP (ref 110–370)
WBC # BLD: 7.7 K/UL — SIGNIFICANT CHANGE UP (ref 3.8–10.5)
WBC # FLD AUTO: 7.7 K/UL — SIGNIFICANT CHANGE UP (ref 3.8–10.5)

## 2021-08-13 PROCEDURE — 99214 OFFICE O/P EST MOD 30 MIN: CPT

## 2021-08-13 RX ORDER — ANASTROZOLE TABLETS 1 MG/1
1 TABLET ORAL DAILY
Qty: 90 | Refills: 3 | Status: ACTIVE | COMMUNITY
Start: 2021-03-05 | End: 1900-01-01

## 2021-08-13 NOTE — HISTORY OF PRESENT ILLNESS
[Disease: _____________________] : Disease: [unfilled] [T: ___] : T[unfilled] [N: ___] : N[unfilled] [de-identified] : Patient of Dr Nicola Steen- wants to transfer  care here due to insurance change.\par \par Left breast cancer diagnosed Feb 2018. Core biopsy showed invasive ductal cancer    ER > 90 %  [place] 0%  HER 2 IHC 2/3 +, CISH amplified   Tumor 4.2 cm on imaging MRI , nodes negative.\par April 2018-July 2018  neoadjuvant chemotherapy TCHP x 6 cycles . \par Sept 2018- bilat mastectomies and bilat  SNB ( Dr Urena)  : L breast residual 2.5 cm invasive ductal and lobular cancer grade 2 with DCIS , no LVI,  4 SLN negative R breast: atypical lobular hyperplasia, SLB neg x2     Bilat saline implants. \par \par Completed one year of herceptin/ Perjeta in ~ April 2019. \par \par On hormonal therapy Letrozole since ~ August 2018 . Changed to anastrozole because of arthralgias.\par \par Jan 2020: CT chest follow up 2 mm LLL nodule- lung nodule no longer seen. Thyroid nodule seen. Thyroid sono 1/20/20 1.4 cm left thyroid nodule with microcal. Thyroid biopsy Feb 2020 benign. \par \par  [de-identified] : invasive ductal and lobular  grade 2  [de-identified] : ER > 90 %  [place] 0%  HER2 +2/3  CISH amplified  [de-identified] : Arthralgias better since changed to anastrozole\par Did not go for bone density.\par has low vit D but does not take any supplements ( run out) \par Gained about 15 lbs in one year. \par was less active after fall/ wrist fracture ( healed)

## 2021-08-13 NOTE — ASSESSMENT
[FreeTextEntry1] : 61 y/o female with history of invasive ductal cancer of left breast ER positive HER2 positive diagnosed in 2018.\par S/p neoadjuvant TCHP chemotherapy, bilateral mastectomies SNB  ypT2, ypN0 tumor, completion of one year of herceptin/perjeta,on adjuvant  AI  since August 2018. \par \par Plan for 10 years of adjuvant hormonal therapy.  Continue Anastrozole.\par \par Needs to get baseline bone density - reminded again. \par \par She was non compliant with daily vit D.\par Will start weekly vit D 70446 \par Check level on next visit.\par \par Breast implants x 3 years now. Feels it ? shifted after her fall.\par Needs to follow with plastics- she will call for appointment- will be due for follow up breast MRI to check implant integrity \par \par return visit in 6 months

## 2021-08-13 NOTE — PHYSICAL EXAM
[Normal] : affect appropriate [de-identified] : bilat mastectomies with implant ,no axillary adenopathy

## 2021-08-13 NOTE — REVIEW OF SYSTEMS
[Patient Intake Form Reviewed] : Patient intake form was reviewed [Negative] : Heme/Lymph [Recent Change In Weight] : ~T recent weight change [FreeTextEntry2] : gained [FreeTextEntry9] : per HPI

## 2021-08-14 DIAGNOSIS — Z51.81 ENCOUNTER FOR THERAPEUTIC DRUG LEVEL MONITORING: ICD-10-CM

## 2021-08-14 DIAGNOSIS — E55.9 VITAMIN D DEFICIENCY, UNSPECIFIED: ICD-10-CM

## 2021-08-26 ENCOUNTER — APPOINTMENT (OUTPATIENT)
Dept: ORTHOPEDIC SURGERY | Facility: CLINIC | Age: 61
End: 2021-08-26

## 2022-02-11 ENCOUNTER — APPOINTMENT (OUTPATIENT)
Dept: HEMATOLOGY ONCOLOGY | Facility: CLINIC | Age: 62
End: 2022-02-11

## 2022-10-08 ENCOUNTER — OUTPATIENT (OUTPATIENT)
Dept: OUTPATIENT SERVICES | Facility: HOSPITAL | Age: 62
LOS: 1 days | Discharge: HOME | End: 2022-10-08

## 2022-10-08 DIAGNOSIS — Z90.13 ACQUIRED ABSENCE OF BILATERAL BREASTS AND NIPPLES: Chronic | ICD-10-CM

## 2022-10-08 DIAGNOSIS — Z98.82 BREAST IMPLANT STATUS: Chronic | ICD-10-CM

## 2022-10-08 DIAGNOSIS — Z90.710 ACQUIRED ABSENCE OF BOTH CERVIX AND UTERUS: Chronic | ICD-10-CM

## 2022-10-08 DIAGNOSIS — Z98.890 OTHER SPECIFIED POSTPROCEDURAL STATES: Chronic | ICD-10-CM

## 2022-10-08 DIAGNOSIS — Z98.891 HISTORY OF UTERINE SCAR FROM PREVIOUS SURGERY: Chronic | ICD-10-CM

## 2022-10-08 DIAGNOSIS — C50.912 MALIGNANT NEOPLASM OF UNSPECIFIED SITE OF LEFT FEMALE BREAST: ICD-10-CM

## 2022-10-11 ENCOUNTER — OUTPATIENT (OUTPATIENT)
Dept: OUTPATIENT SERVICES | Facility: HOSPITAL | Age: 62
LOS: 1 days | Discharge: HOME | End: 2022-10-11

## 2022-10-11 DIAGNOSIS — Z90.13 ACQUIRED ABSENCE OF BILATERAL BREASTS AND NIPPLES: Chronic | ICD-10-CM

## 2022-10-11 DIAGNOSIS — Z90.710 ACQUIRED ABSENCE OF BOTH CERVIX AND UTERUS: Chronic | ICD-10-CM

## 2022-10-11 DIAGNOSIS — C50.912 MALIGNANT NEOPLASM OF UNSPECIFIED SITE OF LEFT FEMALE BREAST: ICD-10-CM

## 2022-10-11 DIAGNOSIS — Z98.890 OTHER SPECIFIED POSTPROCEDURAL STATES: Chronic | ICD-10-CM

## 2022-10-11 DIAGNOSIS — Z98.82 BREAST IMPLANT STATUS: Chronic | ICD-10-CM

## 2022-10-11 DIAGNOSIS — Z98.891 HISTORY OF UTERINE SCAR FROM PREVIOUS SURGERY: Chronic | ICD-10-CM

## 2022-10-12 ENCOUNTER — APPOINTMENT (OUTPATIENT)
Dept: HEMATOLOGY ONCOLOGY | Facility: CLINIC | Age: 62
End: 2022-10-12

## 2022-10-12 VITALS
HEART RATE: 71 BPM | OXYGEN SATURATION: 97 % | TEMPERATURE: 97.8 F | RESPIRATION RATE: 18 BRPM | SYSTOLIC BLOOD PRESSURE: 120 MMHG | DIASTOLIC BLOOD PRESSURE: 77 MMHG | WEIGHT: 156.38 LBS | BODY MASS INDEX: 27.7 KG/M2

## 2022-10-12 DIAGNOSIS — Z51.81 ENCOUNTER FOR THERAPEUTIC DRUG LEVEL MONITORING: ICD-10-CM

## 2022-10-12 DIAGNOSIS — L65.9 NONSCARRING HAIR LOSS, UNSPECIFIED: ICD-10-CM

## 2022-10-12 DIAGNOSIS — Z51.81 ENCOUNTER FOR THERAPEUTIC DRUG LVL MONITORING: ICD-10-CM

## 2022-10-12 DIAGNOSIS — N63.10 UNSPECIFIED LUMP IN THE RIGHT BREAST, UNSPECIFIED QUADRANT: ICD-10-CM

## 2022-10-12 DIAGNOSIS — Z79.899 ENCOUNTER FOR THERAPEUTIC DRUG LVL MONITORING: ICD-10-CM

## 2022-10-12 PROCEDURE — 99214 OFFICE O/P EST MOD 30 MIN: CPT

## 2022-10-12 NOTE — HISTORY OF PRESENT ILLNESS
[Disease: _____________________] : Disease: [unfilled] [T: ___] : T[unfilled] [N: ___] : N[unfilled] [de-identified] : MAC HAMILTON is a 62 y.o.F who we are following for an ER+, HER2+, left sided yT2yN0, invasive ductal and lobular cancer.\par \par Feb 2018 - Presented with a left breast cancer. Core biopsy showed invasive ductal cancer, ER > 90%,  [place] 0%,  HER 2 IHC 2/3+, CISH amplified.   \par Tumor 4.2cm on imaging MRI, nodes negative.\par April 2018 - July 2018 - neoadjuvant TCHP x 6 cycles. (Dr. Nicola Steen)\par Sept 2018 - b/l mastectomies and b/l  SLNB (Dr. Urena) - L breast residual 2.5 cm invasive ductal and lobular cancer, grade 2, with DCIS, no LVI, 4 SLN negative. \par R breast: ALH, SLB neg x 2.     Bilat saline implants. \par \par ~ April 2019 - Completed one year of Herceptin/ Perjeta. \par \par ~ August 2018 -  Started on hormonal therapy with Letrozole. Changed to anastrozole because of arthralgias.\par \par Jan 2020 -  CT chest follow up 2 mm LLL nodule - lung nodule no longer seen. Thyroid nodule seen. \par 1/20/20 - Thyroid sono - 1.4 cm left thyroid nodule with microcal. \par Feb 2020 - Thyroid biopsy -  benign. \par \par 6/9/20 - Transferred her care to our office due to insurance issues. [de-identified] : invasive ductal and lobular  grade 2  [de-identified] : ER > 90%,  [place] 0%,  HER2 +2/3  CISH amplified  [de-identified] : Patient reports still has some arthralgias, but feels they are better since changed to anastrozole.\par Concerned about hair thinning. \par Has not gone for bone density yet - has issues with transportation. \par Tried to get appoint with PCP for annual but waiting for cancellation since no available appointments. \par Feels new small mass on right breast, UIQ.  \par \par Denies any changes in her family, medical, or social history since her last visit of 8/13/21.

## 2022-10-12 NOTE — ASSESSMENT
[FreeTextEntry1] : Patient is a 62 y.o. with an ER+, HER2+, invasive ductal and lobular cancer of left breast diagnosed in 2018.\par S/p neoadjuvant TCHP, bilateral mastectomies ->  ypT2, ypN0 tumor, and completion of one year of Herceptin/Perjeta.\par On adjuvant  AI since August 2018. \par Patient now with new suspicious mass on contralateral breast. \par \par On further discussion with patient she admits to "not really taking" the Arimidex as she does not like how it makes her feel.  When asked how often this means she didn't really answer. \par \par Plan was to continue AI for a total of  7- 10 years - or until at least 8/2025. \par Reviewed with patient compliance is importance -  there are other options if she feels the anastrozole is difficult for her. \par \par At this time will defer changing hormonal therapy and get breast sono. Explained may also need breast biopsy as well.  Was advised to schedule bone density at same time!!. \par  \par Will then determine plan based on scan results.  Patient aware to have done in a timely fashion. \par \par Unclear if patient ever had genetic testing done - will review old records to see. (? at higher risk of recurrence?)\par \par PCP EWA MURDOCK

## 2022-10-12 NOTE — REVIEW OF SYSTEMS
[Patient Intake Form Reviewed] : Patient intake form was reviewed [Recent Change In Weight] : ~T recent weight change [Negative] : Heme/Lymph [FreeTextEntry2] : lost weight [FreeTextEntry3] : new floaters, very nearsighted [FreeTextEntry9] : Some occasional cramping in fingers and toes at night [de-identified] : hair thinning

## 2022-10-12 NOTE — PHYSICAL EXAM
[Normal] : normal spine exam without palpable tenderness, no kyphosis or scoliosis [de-identified] : anicteric [de-identified] : no c/c/e [de-identified] : bilat mastectomies with implants, horizontal scars, no nipples.  Right breast with small irregular mass Subcutaneously at ~ 2:30 distally towards CW.  + right axillary adenopathy.   Left breast with implant firmer, appears to have post RT changes but she di dnot have RT, no left adenopathy. [de-identified] : no rashes

## 2022-11-29 DIAGNOSIS — R92.8 OTHER ABNORMAL AND INCONCLUSIVE FINDINGS ON DIAGNOSTIC IMAGING OF BREAST: ICD-10-CM

## 2022-11-30 ENCOUNTER — NON-APPOINTMENT (OUTPATIENT)
Age: 62
End: 2022-11-30

## 2023-01-09 ENCOUNTER — APPOINTMENT (OUTPATIENT)
Dept: INTERNAL MEDICINE | Facility: CLINIC | Age: 63
End: 2023-01-09

## 2023-04-30 NOTE — PROGRESS NOTE ADULT - PROBLEM/PLAN-2
DISPLAY PLAN FREE TEXT
None

## 2023-05-11 ENCOUNTER — APPOINTMENT (OUTPATIENT)
Dept: INTERNAL MEDICINE | Facility: CLINIC | Age: 63
End: 2023-05-11
Payer: MEDICAID

## 2023-05-11 VITALS
HEIGHT: 63 IN | BODY MASS INDEX: 27.29 KG/M2 | HEART RATE: 84 BPM | DIASTOLIC BLOOD PRESSURE: 80 MMHG | OXYGEN SATURATION: 96 % | SYSTOLIC BLOOD PRESSURE: 120 MMHG | TEMPERATURE: 98.9 F | WEIGHT: 154 LBS

## 2023-05-11 DIAGNOSIS — I34.0 NONRHEUMATIC MITRAL (VALVE) INSUFFICIENCY: ICD-10-CM

## 2023-05-11 PROCEDURE — 99214 OFFICE O/P EST MOD 30 MIN: CPT

## 2023-05-11 NOTE — HISTORY OF PRESENT ILLNESS
[FreeTextEntry1] : Pt is here for follow up of multiple medical problems including\par breast cancer. Has been seen after a long absence after being hospitalized  for chest pain.  Appears to have R/o MI but was seen to have   minimal plaque on CT scoring tropninwere neg.

## 2023-06-02 DIAGNOSIS — Z85.3 PERSONAL HISTORY OF MALIGNANT NEOPLASM OF BREAST: ICD-10-CM

## 2023-06-02 RX ORDER — CHOLECALCIFEROL (VITAMIN D3) 1250 MCG
1.25 MG CAPSULE ORAL
Qty: 12 | Refills: 3 | Status: DISCONTINUED | COMMUNITY
Start: 2021-08-13 | End: 2023-06-02

## 2023-06-02 RX ORDER — ALBUTEROL SULFATE 90 UG/1
108 (90 BASE) INHALANT RESPIRATORY (INHALATION)
Qty: 1 | Refills: 0 | Status: DISCONTINUED | COMMUNITY
Start: 2020-01-07 | End: 2023-06-02

## 2023-06-23 ENCOUNTER — APPOINTMENT (OUTPATIENT)
Dept: CARDIOLOGY | Facility: CLINIC | Age: 63
End: 2023-06-23

## 2023-07-27 NOTE — PROGRESS NOTE ADULT - PROBLEM SELECTOR PLAN 4
likely 2nd chemo  c diff neg, cxneg for enteric pathogens, o&p pending, pcr pending  immodium started as infection ruled out  monitor BM, lytes repletion  cont ivf, immodium  f/u GI and ID recs normal...

## 2023-08-29 NOTE — PROGRESS NOTE ADULT - PROBLEM SELECTOR PROBLEM 1
Ultrasound showing evidence of hypervascular left epididymis. This would confirm the left epididymitis. Also noting some drainage from the postop incision. We will start Levaquin 500 mg first dose given in ED. Prescription sent to your local [address] [place] [address]. Contact your urologist tomorrow for an appointment within the next 1 to 2 days. I do recommend ibuprofen 600 mg and/or Tylenol 1000 mg every 6 or 8 hours for pain control.
Acute cystitis with hematuria
Diarrhea, unspecified type
Diarrhea, unspecified type
Acute cystitis with hematuria
Hematuria
Hemorrhagic cystitis

## 2023-09-01 NOTE — PROGRESS NOTE ADULT - PROBLEM SELECTOR PLAN 1
Urine is currently pink and patient reports no symptoms suggestive of uti.   CT scan shows nephrolithiasis   with mild right hydronephrosis  urine cx negative.  doubt infectious etiology  monitor off antibx General

## 2023-12-07 NOTE — PROGRESS NOTE ADULT - ASSESSMENT
57 f with breast cancer on docetaxel and carboplatin last dose Wednesday p/w hematuria, leukocytosis and noted loose stools but neg ID workup Alert and oriented to person, place and time Statement Selected

## 2024-05-13 NOTE — PROGRESS NOTE ADULT - PROBLEM/PLAN-3
Good morning. Can someone please contact pt to schedule cardiology appointment with Dr. Gallardo or KATHY Colvin per Dr. Gallardo's note for sometime this week? Thank you, I am unable to schedule.   
DISPLAY PLAN FREE TEXT
Unknown if ever smoked

## 2024-05-14 ENCOUNTER — NON-APPOINTMENT (OUTPATIENT)
Age: 64
End: 2024-05-14

## 2024-05-14 ENCOUNTER — RESULT CHARGE (OUTPATIENT)
Age: 64
End: 2024-05-14

## 2024-05-14 ENCOUNTER — APPOINTMENT (OUTPATIENT)
Dept: INTERNAL MEDICINE | Facility: CLINIC | Age: 64
End: 2024-05-14
Payer: MEDICAID

## 2024-05-14 DIAGNOSIS — Z87.898 PERSONAL HISTORY OF OTHER SPECIFIED CONDITIONS: ICD-10-CM

## 2024-05-14 DIAGNOSIS — Z91.89 OTHER SPECIFIED PERSONAL RISK FACTORS, NOT ELSEWHERE CLASSIFIED: ICD-10-CM

## 2024-05-14 DIAGNOSIS — R07.9 CHEST PAIN, UNSPECIFIED: ICD-10-CM

## 2024-05-14 DIAGNOSIS — E78.5 HYPERLIPIDEMIA, UNSPECIFIED: ICD-10-CM

## 2024-05-14 DIAGNOSIS — Z72.0 TOBACCO USE: ICD-10-CM

## 2024-05-14 DIAGNOSIS — E04.1 NONTOXIC SINGLE THYROID NODULE: ICD-10-CM

## 2024-05-14 DIAGNOSIS — R41.3 OTHER AMNESIA: ICD-10-CM

## 2024-05-14 DIAGNOSIS — L02.91 CUTANEOUS ABSCESS, UNSPECIFIED: ICD-10-CM

## 2024-05-14 DIAGNOSIS — J45.909 UNSPECIFIED ASTHMA, UNCOMPLICATED: ICD-10-CM

## 2024-05-14 DIAGNOSIS — I10 ESSENTIAL (PRIMARY) HYPERTENSION: ICD-10-CM

## 2024-05-14 DIAGNOSIS — R01.1 CARDIAC MURMUR, UNSPECIFIED: ICD-10-CM

## 2024-05-14 DIAGNOSIS — C50.912 MALIGNANT NEOPLASM OF UNSPECIFIED SITE OF LEFT FEMALE BREAST: ICD-10-CM

## 2024-05-14 DIAGNOSIS — Z00.00 ENCOUNTER FOR GENERAL ADULT MEDICAL EXAMINATION W/OUT ABNORMAL FINDINGS: ICD-10-CM

## 2024-05-14 LAB
DATE COLLECTED: NORMAL
HEMOCCULT SP1 STL QL: NEGATIVE
QUALITY CONTROL: YES

## 2024-05-14 PROCEDURE — 93000 ELECTROCARDIOGRAM COMPLETE: CPT

## 2024-05-14 PROCEDURE — 99396 PREV VISIT EST AGE 40-64: CPT | Mod: 25

## 2024-05-14 RX ORDER — CHLORTHALIDONE 50 MG/1
50 TABLET ORAL DAILY
Qty: 90 | Refills: 0 | Status: ACTIVE | COMMUNITY
Start: 2024-05-14 | End: 1900-01-01

## 2024-05-14 NOTE — HEALTH RISK ASSESSMENT
[No] : In the past 12 months have you used drugs other than those required for medical reasons? No [No falls in past year] : Patient reported no falls in the past year [0] : 2) Feeling down, depressed, or hopeless: Not at all (0) [PHQ-2 Negative - No further assessment needed] : PHQ-2 Negative - No further assessment needed [None] : None [With Family] : lives with family [Fully functional (bathing, dressing, toileting, transferring, walking, feeding)] : Fully functional (bathing, dressing, toileting, transferring, walking, feeding) [Fully functional (using the telephone, shopping, preparing meals, housekeeping, doing laundry, using] : Fully functional and needs no help or supervision to perform IADLs (using the telephone, shopping, preparing meals, housekeeping, doing laundry, using transportation, managing medications and managing finances) [Smoke Detector] : smoke detector [Carbon Monoxide Detector] : carbon monoxide detector [Seat Belt] :  uses seat belt [Sunscreen] : uses sunscreen [Current] : Current [20 or more] : 20 or more [> 15 Years] : > 15 Years [de-identified] : no [de-identified] : reg [[id number]] : 0 [EyeExamDate] : 1/1/19 [Change in mental status noted] : No change in mental status noted [Reports changes in hearing] : Reports no changes in hearing [Guns at Home] : no guns at home [MammogramComments] : mastectomy [ColonoscopyDate] : 1/1/15 [AdvancecareDate] : 5/14/24

## 2024-05-23 ENCOUNTER — APPOINTMENT (OUTPATIENT)
Dept: CARDIOLOGY | Facility: CLINIC | Age: 64
End: 2024-05-23

## 2024-06-11 PROBLEM — Z12.11 COLON CANCER SCREENING: Status: ACTIVE | Noted: 2024-06-11

## 2024-06-11 PROBLEM — Z12.4 CERVICAL CANCER SCREENING: Status: ACTIVE | Noted: 2024-06-11

## 2024-06-18 ENCOUNTER — APPOINTMENT (OUTPATIENT)
Dept: INTERNAL MEDICINE | Facility: CLINIC | Age: 64
End: 2024-06-18

## 2024-06-18 ENCOUNTER — APPOINTMENT (OUTPATIENT)
Dept: OBGYN | Facility: CLINIC | Age: 64
End: 2024-06-18

## 2024-06-18 DIAGNOSIS — Z12.11 ENCOUNTER FOR SCREENING FOR MALIGNANT NEOPLASM OF COLON: ICD-10-CM

## 2024-06-18 DIAGNOSIS — Z12.4 ENCOUNTER FOR SCREENING FOR MALIGNANT NEOPLASM OF CERVIX: ICD-10-CM

## 2025-04-15 NOTE — ASU PREOP CHECKLIST - ALLERGY BAND ON
Please remember you need to be fasting when you obtain your labwork. That means nothing to eat or drink for 8-12 hours (water or black coffee are OK though).    
no known allergies

## 2025-05-15 ENCOUNTER — APPOINTMENT (OUTPATIENT)
Dept: INTERNAL MEDICINE | Facility: CLINIC | Age: 65
End: 2025-05-15